# Patient Record
Sex: FEMALE | Race: WHITE
[De-identification: names, ages, dates, MRNs, and addresses within clinical notes are randomized per-mention and may not be internally consistent; named-entity substitution may affect disease eponyms.]

---

## 2017-03-16 ENCOUNTER — HOSPITAL (OUTPATIENT)
Dept: OTHER | Age: 62
End: 2017-03-16
Attending: INTERNAL MEDICINE

## 2017-03-16 LAB
ANALYZER ANC (IANC): NORMAL
ANION GAP SERPL CALC-SCNC: 15 MMOL/L (ref 10–20)
APTT PPP: 25 SECONDS (ref 22–30)
APTT PPP: NORMAL S
BASOPHILS # BLD: 0 THOUSAND/MCL (ref 0–0.3)
BASOPHILS NFR BLD: 0 %
BUN SERPL-MCNC: 15 MG/DL (ref 6–20)
BUN/CREAT SERPL: 24 (ref 7–25)
CALCIUM SERPL-MCNC: 8.7 MG/DL (ref 8.4–10.2)
CHLORIDE: 103 MMOL/L (ref 98–107)
CO2 SERPL-SCNC: 25 MMOL/L (ref 21–32)
CREAT SERPL-MCNC: 0.63 MG/DL (ref 0.51–0.95)
D DIMER PPP FEU-MCNC: 0.53 MG/L FEU
DIFFERENTIAL METHOD BLD: NORMAL
EOSINOPHIL # BLD: 0.5 THOUSAND/MCL (ref 0.1–0.5)
EOSINOPHIL NFR BLD: 7 %
ERYTHROCYTE [DISTWIDTH] IN BLOOD: 14 % (ref 11–15)
GLUCOSE SERPL-MCNC: 193 MG/DL (ref 65–99)
HEMATOCRIT: 37.1 % (ref 36–46.5)
HGB BLD-MCNC: 12 GM/DL (ref 12–15.5)
INR PPP: 1
LYMPHOCYTES # BLD: 3.8 THOUSAND/MCL (ref 1–4)
LYMPHOCYTES NFR BLD: 53 %
MCH RBC QN AUTO: 27.8 PG (ref 26–34)
MCHC RBC AUTO-ENTMCNC: 32.3 GM/DL (ref 32–36.5)
MCV RBC AUTO: 85.9 FL (ref 78–100)
MONOCYTES # BLD: 0.3 THOUSAND/MCL (ref 0.3–0.9)
MONOCYTES NFR BLD: 4 %
NEUTROPHILS # BLD: 2.5 THOUSAND/MCL (ref 1.8–7.7)
NEUTROPHILS NFR BLD: 36 %
NEUTS SEG NFR BLD: NORMAL %
PERCENT NRBC: NORMAL
PLATELET # BLD: 388 THOUSAND/MCL (ref 140–450)
POTASSIUM SERPL-SCNC: 3.3 MMOL/L (ref 3.4–5.1)
PROTHROMBIN TIME: 10.3 SECONDS (ref 9.7–11.8)
PROTHROMBIN TIME: NORMAL
RBC # BLD: 4.32 MILLION/MCL (ref 4–5.2)
SODIUM SERPL-SCNC: 140 MMOL/L (ref 135–145)
TROPONIN I SERPL HS-MCNC: <0.02 NG/ML
WBC # BLD: 7.1 THOUSAND/MCL (ref 4.2–11)

## 2017-03-17 LAB
2009 H1N1 SUBTYPE (RF1N1): NOT DETECTED
ADENOVIRUS (RADENO): NOT DETECTED
ALBUMIN SERPL-MCNC: 3.3 GM/DL (ref 3.6–5.1)
ALBUMIN/GLOB SERPL: 0.9 {RATIO} (ref 1–2.4)
ALP SERPL-CCNC: 115 UNIT/L (ref 45–117)
ALT SERPL-CCNC: 14 UNIT/L
ANALYZER ANC (IANC): ABNORMAL
ANION GAP SERPL CALC-SCNC: 15 MMOL/L (ref 10–20)
AST SERPL-CCNC: 12 UNIT/L
BILIRUB SERPL-MCNC: 0.2 MG/DL (ref 0.2–1)
BOCAVIRUS (RBOCA): NOT DETECTED
BUN SERPL-MCNC: 12 MG/DL (ref 6–20)
BUN/CREAT SERPL: 23 (ref 7–25)
C. PNEUMONIAE (RCHLP): NOT DETECTED
CALCIUM SERPL-MCNC: 8.9 MG/DL (ref 8.4–10.2)
CHLORIDE: 107 MMOL/L (ref 98–107)
CO2 SERPL-SCNC: 24 MMOL/L (ref 21–32)
CORONAVIRUS 229E (RC229E): NOT DETECTED
CORONAVIRUS HKU1 (RCHKU1): NOT DETECTED
CORONAVIRUS NL63 (RCNL63): NOT DETECTED
CORONAVIRUS OC43 (RCO43): NOT DETECTED
CREAT SERPL-MCNC: 0.52 MG/DL (ref 0.51–0.95)
ERYTHROCYTE [DISTWIDTH] IN BLOOD: 14.3 % (ref 11–15)
GLOBULIN SER-MCNC: 3.5 GM/DL (ref 2–4)
GLUCOSE BLDC GLUCOMTR-MCNC: 263 MG/DL (ref 65–99)
GLUCOSE BLDC GLUCOMTR-MCNC: 330 MG/DL (ref 65–99)
GLUCOSE BLDC GLUCOMTR-MCNC: 331 MG/DL (ref 65–99)
GLUCOSE BLDC GLUCOMTR-MCNC: 359 MG/DL (ref 65–99)
GLUCOSE SERPL-MCNC: 323 MG/DL (ref 65–99)
HEMATOCRIT: 34.7 % (ref 36–46.5)
HGB BLD-MCNC: 11.3 GM/DL (ref 12–15.5)
INFLUENZA A SUBTYPE H1 (RFLH1): NOT DETECTED
INFLUENZA A SUBTYPE H3 (RFLH3): NOT DETECTED
INFLUENZA A UNSUBTYPABLE (RIAU): NOT DETECTED
INFLUENZA B VIRUS (XFLUB): NOT DETECTED
M. PNEUMONIAE (RMYPP): NOT DETECTED
MCH RBC QN AUTO: 27.8 PG (ref 26–34)
MCHC RBC AUTO-ENTMCNC: 32.6 GM/DL (ref 32–36.5)
MCV RBC AUTO: 85.3 FL (ref 78–100)
METAPNEUMOVIRUS (RMETA): NOT DETECTED
PARAINFLUENZA, TYPE 1 (RPAR1): NOT DETECTED
PARAINFLUENZA, TYPE 2 (RPAR2): NOT DETECTED
PARAINFLUENZA, TYPE 3 (RPAR3): NOT DETECTED
PARAINFLUENZA, TYPE 4 (RPAR4): NOT DETECTED
PLATELET # BLD: 341 THOUSAND/MCL (ref 140–450)
POTASSIUM SERPL-SCNC: 4 MMOL/L (ref 3.4–5.1)
PROCALCITONIN SERPL IA-MCNC: <0.05 NG/ML
PROT SERPL-MCNC: 6.8 GM/DL (ref 6.4–8.2)
RBC # BLD: 4.07 MILLION/MCL (ref 4–5.2)
RHINOVIRUS/ENTEROVIRUS (RRHINO): NOT DETECTED
RSV, SUBTYPE A (RRSVA): NOT DETECTED
RSV, SUBTYPE B (RRSVB): NOT DETECTED
SODIUM SERPL-SCNC: 142 MMOL/L (ref 135–145)
SPECIMEN SOURCE: NORMAL
WBC # BLD: 5.3 THOUSAND/MCL (ref 4.2–11)

## 2017-03-18 LAB
ANALYZER ANC (IANC): ABNORMAL
ANION GAP SERPL CALC-SCNC: 16 MMOL/L (ref 10–20)
BASOPHILS # BLD: 0 THOUSAND/MCL (ref 0–0.3)
BASOPHILS NFR BLD: 0 %
BUN SERPL-MCNC: 24 MG/DL (ref 6–20)
BUN/CREAT SERPL: 39 (ref 7–25)
CALCIUM SERPL-MCNC: 8.9 MG/DL (ref 8.4–10.2)
CHLORIDE: 107 MMOL/L (ref 98–107)
CO2 SERPL-SCNC: 24 MMOL/L (ref 21–32)
CREAT SERPL-MCNC: 0.62 MG/DL (ref 0.51–0.95)
DIFFERENTIAL METHOD BLD: ABNORMAL
EOSINOPHIL # BLD: 0 THOUSAND/MCL (ref 0.1–0.5)
EOSINOPHIL NFR BLD: 0 %
ERYTHROCYTE [DISTWIDTH] IN BLOOD: 14.4 % (ref 11–15)
GLUCOSE BLDC GLUCOMTR-MCNC: 300 MG/DL (ref 65–99)
GLUCOSE BLDC GLUCOMTR-MCNC: 311 MG/DL (ref 65–99)
GLUCOSE BLDC GLUCOMTR-MCNC: 341 MG/DL (ref 65–99)
GLUCOSE BLDC GLUCOMTR-MCNC: 366 MG/DL (ref 65–99)
GLUCOSE BLDC GLUCOMTR-MCNC: 414 MG/DL (ref 65–99)
GLUCOSE SERPL-MCNC: 371 MG/DL (ref 65–99)
HEMATOCRIT: 35.3 % (ref 36–46.5)
HGB BLD-MCNC: 11.3 GM/DL (ref 12–15.5)
LYMPHOCYTES # BLD: 0.9 THOUSAND/MCL (ref 1–4)
LYMPHOCYTES NFR BLD: 6 %
MCH RBC QN AUTO: 27.5 PG (ref 26–34)
MCHC RBC AUTO-ENTMCNC: 32 GM/DL (ref 32–36.5)
MCV RBC AUTO: 85.9 FL (ref 78–100)
MONOCYTES # BLD: 0.3 THOUSAND/MCL (ref 0.3–0.9)
MONOCYTES NFR BLD: 2 %
NEUTROPHILS # BLD: 14.1 THOUSAND/MCL (ref 1.8–7.7)
NEUTROPHILS NFR BLD: 92 %
NEUTS SEG NFR BLD: ABNORMAL %
PERCENT NRBC: ABNORMAL
PLATELET # BLD: 383 THOUSAND/MCL (ref 140–450)
POTASSIUM SERPL-SCNC: 4.2 MMOL/L (ref 3.4–5.1)
RBC # BLD: 4.11 MILLION/MCL (ref 4–5.2)
SODIUM SERPL-SCNC: 143 MMOL/L (ref 135–145)
WBC # BLD: 15.2 THOUSAND/MCL (ref 4.2–11)

## 2017-03-19 LAB
ANALYZER ANC (IANC): ABNORMAL
ANION GAP SERPL CALC-SCNC: 12 MMOL/L (ref 10–20)
BASOPHILS # BLD: 0 THOUSAND/MCL (ref 0–0.3)
BASOPHILS NFR BLD: 0 %
BUN SERPL-MCNC: 26 MG/DL (ref 6–20)
BUN/CREAT SERPL: 46 (ref 7–25)
CALCIUM SERPL-MCNC: 8.6 MG/DL (ref 8.4–10.2)
CHLORIDE: 107 MMOL/L (ref 98–107)
CO2 SERPL-SCNC: 26 MMOL/L (ref 21–32)
CREAT SERPL-MCNC: 0.56 MG/DL (ref 0.51–0.95)
DIFFERENTIAL METHOD BLD: ABNORMAL
EOSINOPHIL # BLD: 0 THOUSAND/MCL (ref 0.1–0.5)
EOSINOPHIL NFR BLD: 0 %
ERYTHROCYTE [DISTWIDTH] IN BLOOD: 14.4 % (ref 11–15)
GLUCOSE BLDC GLUCOMTR-MCNC: 293 MG/DL (ref 65–99)
GLUCOSE BLDC GLUCOMTR-MCNC: 294 MG/DL (ref 65–99)
GLUCOSE BLDC GLUCOMTR-MCNC: 297 MG/DL (ref 65–99)
GLUCOSE BLDC GLUCOMTR-MCNC: 305 MG/DL (ref 65–99)
GLUCOSE BLDC GLUCOMTR-MCNC: 377 MG/DL (ref 65–99)
GLUCOSE SERPL-MCNC: 271 MG/DL (ref 65–99)
HEMATOCRIT: 33 % (ref 36–46.5)
HGB BLD-MCNC: 10.9 GM/DL (ref 12–15.5)
LYMPHOCYTES # BLD: 0.8 THOUSAND/MCL (ref 1–4)
LYMPHOCYTES NFR BLD: 6 %
MCH RBC QN AUTO: 28.2 PG (ref 26–34)
MCHC RBC AUTO-ENTMCNC: 33 GM/DL (ref 32–36.5)
MCV RBC AUTO: 85.3 FL (ref 78–100)
MONOCYTES # BLD: 0.4 THOUSAND/MCL (ref 0.3–0.9)
MONOCYTES NFR BLD: 4 %
NEUTROPHILS # BLD: 11.2 THOUSAND/MCL (ref 1.8–7.7)
NEUTROPHILS NFR BLD: 90 %
NEUTS SEG NFR BLD: ABNORMAL %
PERCENT NRBC: ABNORMAL
PLATELET # BLD: 351 THOUSAND/MCL (ref 140–450)
POTASSIUM SERPL-SCNC: 4.2 MMOL/L (ref 3.4–5.1)
RBC # BLD: 3.87 MILLION/MCL (ref 4–5.2)
SODIUM SERPL-SCNC: 141 MMOL/L (ref 135–145)
WBC # BLD: 12.4 THOUSAND/MCL (ref 4.2–11)

## 2017-03-20 LAB
GLUCOSE BLDC GLUCOMTR-MCNC: 170 MG/DL (ref 65–99)
GLUCOSE BLDC GLUCOMTR-MCNC: 234 MG/DL (ref 65–99)
GLUCOSE BLDC GLUCOMTR-MCNC: 264 MG/DL (ref 65–99)
GLUCOSE BLDC GLUCOMTR-MCNC: 271 MG/DL (ref 65–99)

## 2017-06-08 ENCOUNTER — OFFICE VISIT (OUTPATIENT)
Dept: PULMONOLOGY | Facility: CLINIC | Age: 62
End: 2017-06-08

## 2017-06-08 VITALS
DIASTOLIC BLOOD PRESSURE: 67 MMHG | SYSTOLIC BLOOD PRESSURE: 107 MMHG | RESPIRATION RATE: 18 BRPM | BODY MASS INDEX: 34.15 KG/M2 | HEIGHT: 64 IN | HEART RATE: 63 BPM | OXYGEN SATURATION: 98 % | WEIGHT: 200 LBS

## 2017-06-08 DIAGNOSIS — G47.33 OSA (OBSTRUCTIVE SLEEP APNEA): Primary | ICD-10-CM

## 2017-06-08 PROCEDURE — 99212 OFFICE O/P EST SF 10 MIN: CPT | Performed by: INTERNAL MEDICINE

## 2017-06-08 PROCEDURE — 99244 OFF/OP CNSLTJ NEW/EST MOD 40: CPT | Performed by: INTERNAL MEDICINE

## 2017-06-08 NOTE — H&P
Referring Physician  Amna Enriquez MD    Chief Complaint  Asthma    History of Present Illness  Patient presents to pulmonary clinic for initial visit. Patient admits to underlying history of asthma first diagnosed approximately 4 years ago.   She s coronary artery disease, hypertension     Social History  Tobacco: Denies  Alcohol: Significant intake  Illicit Drugs: Denies    Medications    Current Outpatient Prescriptions on File Prior to Visit:  Metoprolol Succinate ER (TOPROL XL) 50 MG Oral Tablet Clindamycin HCl (CLEOCIN) 300 MG Oral Cap Take  by mouth 3 (three) times daily.  Disp:  Rfl:    Warfarin Sodium (COUMADIN) 5 MG Oral Tab Take 1 tablet (5 mg) Mon/Thurs and 1-1/2 tablets (7.5 mg) rest of week OR as directed Disp: 125 tablet Rfl: 3     No c surgery but continues to have multiple episodes yearly of sinusitis. I advised her to follow-up with ENT regarding this matter.  -She with underlying history of obstructive sleep apnea but she is noncompliant with CPAP therapy.   She currently does not hav

## 2017-06-08 NOTE — PATIENT INSTRUCTIONS
Contact office 1 week after sleep study if we do not call you regarding results. Notify us before you see ENT so he may send our records.

## 2017-06-15 ENCOUNTER — TELEPHONE (OUTPATIENT)
Dept: PULMONOLOGY | Facility: CLINIC | Age: 62
End: 2017-06-15

## 2017-06-15 DIAGNOSIS — G47.33 OSA (OBSTRUCTIVE SLEEP APNEA): Primary | ICD-10-CM

## 2017-06-15 NOTE — TELEPHONE ENCOUNTER
Pt states she had sleep study done 5 years ago and only used CPAP machine for a short period of time. Spoke to Bushra from [facility] she states she is seeing some Medicaid plans paying for new CPAP machines with the old sleep studies.   Dr. Caroline Abel, do you want

## 2017-06-15 NOTE — TELEPHONE ENCOUNTER
Patel would like to know why  is ordering another diagnostic study in member that has already been diagnosed with HORACIO. Please advise.

## 2017-06-16 NOTE — TELEPHONE ENCOUNTER
Order, face sheet, PSG, progress note faxed to [facility] [phone] and left for Mani Dean to . Fax confirmation received.

## 2017-06-16 NOTE — TELEPHONE ENCOUNTER
I have placed order for auto CPAP based on patient's previous sleep study from 2012.   Patient will need to request download data between 30 and 90 days of receiving device so we may review efficacy and compliance and potentially adjust settings accordingly

## 2017-06-20 ENCOUNTER — TELEPHONE (OUTPATIENT)
Dept: PULMONOLOGY | Facility: CLINIC | Age: 62
End: 2017-06-20

## 2017-06-28 ENCOUNTER — TELEPHONE (OUTPATIENT)
Dept: PULMONOLOGY | Facility: CLINIC | Age: 62
End: 2017-06-28

## 2017-06-28 DIAGNOSIS — G47.33 OSA (OBSTRUCTIVE SLEEP APNEA): Primary | ICD-10-CM

## 2017-06-28 NOTE — TELEPHONE ENCOUNTER
[id number] Marshall Jackson called to inform [facility] that Mrs. Alejandra/Juan denied orders for sleep study. Pts appt has been cancelled for 7/26/17 and pt needs to be informed. For additional questions contact Juan [phone].  Thank You

## 2017-06-28 NOTE — TELEPHONE ENCOUNTER
Spoke to Eduard/[facility] who stts that pt was set up with an auto cpap machine on June 23. Arya Sanchez they have everything that was needed.

## 2017-06-29 NOTE — TELEPHONE ENCOUNTER
Eduard/[facility] notified that pt needs a mask fitting. Yi Patton they will give the patient a call to set up appt.

## 2017-07-27 PROBLEM — M23.91 INTERNAL DERANGEMENT OF KNEE, RIGHT: Status: ACTIVE | Noted: 2017-07-27

## 2017-07-31 PROBLEM — L85.9 HYPERKERATOSIS: Status: ACTIVE | Noted: 2017-07-31

## 2017-09-12 ENCOUNTER — OFFICE VISIT (OUTPATIENT)
Dept: PULMONOLOGY | Facility: CLINIC | Age: 62
End: 2017-09-12

## 2017-09-12 VITALS
HEIGHT: 64 IN | HEART RATE: 66 BPM | SYSTOLIC BLOOD PRESSURE: 145 MMHG | RESPIRATION RATE: 18 BRPM | BODY MASS INDEX: 33.97 KG/M2 | OXYGEN SATURATION: 96 % | DIASTOLIC BLOOD PRESSURE: 83 MMHG | WEIGHT: 199 LBS

## 2017-09-12 DIAGNOSIS — G47.33 OSA (OBSTRUCTIVE SLEEP APNEA): Primary | ICD-10-CM

## 2017-09-12 PROCEDURE — 99212 OFFICE O/P EST SF 10 MIN: CPT | Performed by: INTERNAL MEDICINE

## 2017-09-12 PROCEDURE — 99213 OFFICE O/P EST LOW 20 MIN: CPT | Performed by: INTERNAL MEDICINE

## 2017-09-12 RX ORDER — LOSARTAN POTASSIUM 100 MG/1
TABLET ORAL
COMMUNITY
End: 2020-05-19

## 2017-09-12 RX ORDER — AMOXICILLIN 500 MG/1
CAPSULE ORAL
Refills: 0 | COMMUNITY
Start: 2017-09-11 | End: 2019-06-06 | Stop reason: ALTCHOICE

## 2017-09-12 RX ORDER — IPRATROPIUM BROMIDE AND ALBUTEROL SULFATE 2.5; .5 MG/3ML; MG/3ML
3 SOLUTION RESPIRATORY (INHALATION)
COMMUNITY

## 2017-09-12 RX ORDER — OMEPRAZOLE 40 MG/1
CAPSULE, DELAYED RELEASE ORAL
Refills: 7 | COMMUNITY
Start: 2017-08-30 | End: 2019-10-03 | Stop reason: CLARIF

## 2017-09-12 RX ORDER — BUDESONIDE AND FORMOTEROL FUMARATE DIHYDRATE 160; 4.5 UG/1; UG/1
AEROSOL RESPIRATORY (INHALATION)
Refills: 2 | COMMUNITY
Start: 2017-07-10 | End: 2021-09-09

## 2017-09-12 RX ORDER — ALPRAZOLAM 0.25 MG/1
TABLET ORAL
Refills: 0 | COMMUNITY
Start: 2017-07-12 | End: 2019-06-06 | Stop reason: ALTCHOICE

## 2017-09-12 RX ORDER — TRIAMCINOLONE ACETONIDE 0.25 MG/G
CREAM TOPICAL
COMMUNITY

## 2017-09-12 RX ORDER — CHLORHEXIDINE GLUCONATE 0.12 MG/ML
RINSE ORAL
Refills: 0 | COMMUNITY
Start: 2017-08-30 | End: 2019-06-06 | Stop reason: ALTCHOICE

## 2017-09-12 RX ORDER — CETIRIZINE HYDROCHLORIDE 10 MG/1
TABLET ORAL
Refills: 1 | COMMUNITY
Start: 2017-07-18 | End: 2020-05-19

## 2017-09-12 RX ORDER — AZELASTINE 1 MG/ML
SPRAY, METERED NASAL
COMMUNITY
End: 2019-07-22 | Stop reason: ALTCHOICE

## 2017-09-12 RX ORDER — ATORVASTATIN CALCIUM 10 MG/1
10 TABLET, FILM COATED ORAL DAILY
Refills: 0 | COMMUNITY
Start: 2017-09-12 | End: 2020-05-19

## 2017-09-12 RX ORDER — SERTRALINE HYDROCHLORIDE 100 MG/1
TABLET, FILM COATED ORAL
Refills: 2 | COMMUNITY
Start: 2017-09-12 | End: 2020-12-08 | Stop reason: ALTCHOICE

## 2017-09-12 NOTE — PROGRESS NOTES
Referring Physician  Nola Saenz MD    History of Present Illness  Patient is a 44-year-old female who presents the pulmonary clinic for follow-up visit.   Over the course last several months she states that she had to go to emergency department on Albuterol Sulfate HFA (PROAIR HFA) 108 (90 BASE) MCG/ACT Inhalation Aero Soln Inhale 2 puffs into the lungs every 6 (six) hours as needed.  Disp:  Rfl:    Elastic Bandages & Supports (T.E.D. BELOW KNEE/LARGE) Does not apply Misc please wear daily Disp: 2 that her asthma symptoms are somewhat improved over the course last several months. She admits to using Symbicort, Singulair as scheduled.   She has not been using her nebulizer treatments often which I advised her to use on as-needed basis at home and use

## 2018-07-10 ENCOUNTER — CHARTING TRANS (OUTPATIENT)
Dept: OTHER | Age: 63
End: 2018-07-10

## 2018-08-14 ENCOUNTER — OFFICE VISIT (OUTPATIENT)
Dept: PULMONOLOGY | Facility: CLINIC | Age: 63
End: 2018-08-14
Payer: MEDICAID

## 2018-08-14 VITALS
HEART RATE: 75 BPM | DIASTOLIC BLOOD PRESSURE: 82 MMHG | BODY MASS INDEX: 35.79 KG/M2 | HEIGHT: 63 IN | OXYGEN SATURATION: 98 % | SYSTOLIC BLOOD PRESSURE: 136 MMHG | WEIGHT: 202 LBS | RESPIRATION RATE: 18 BRPM

## 2018-08-14 DIAGNOSIS — G47.33 OSA (OBSTRUCTIVE SLEEP APNEA): Primary | ICD-10-CM

## 2018-08-14 PROCEDURE — 99213 OFFICE O/P EST LOW 20 MIN: CPT | Performed by: INTERNAL MEDICINE

## 2018-08-14 PROCEDURE — 99212 OFFICE O/P EST SF 10 MIN: CPT | Performed by: INTERNAL MEDICINE

## 2018-08-14 NOTE — PROGRESS NOTES
Referring Physician  Nola Saenz MD    History of Present Illness  Patient is a 37-year-old female who presents the pulmonary clinic for follow-up visit. Patient states her asthma symptoms have been somewhat well controlled.   However she does adm Oral Tab TAKE 1 TABLET BY MOUTH DAILY Disp: 90 tablet Rfl: 2   MetFORMIN HCl (GLUCOPHAGE) 500 MG Oral Tab TAKE 1 TABLET BY MOUTH 2 TIMES DAILY WITH MEALS  (Patient taking differently: Pt stts she takes 750 mg daily) Disp: 60 tablet Rfl: 6   predniSONE (DEL RASH  Toradol [Ketorolac *        Comment:Shortness of breath (also received fentanyl and             hydromorphone at the same time)    Physical Exam  Constitutional: no acute distress, alert, oriented  HEENT: PERRL, EOMI, nares patents, no nasal p

## 2018-08-15 ENCOUNTER — TELEPHONE (OUTPATIENT)
Dept: PULMONOLOGY | Facility: CLINIC | Age: 63
End: 2018-08-15

## 2018-08-15 DIAGNOSIS — G47.33 OSA (OBSTRUCTIVE SLEEP APNEA): Primary | ICD-10-CM

## 2018-08-15 NOTE — TELEPHONE ENCOUNTER
Pt had office visit yesterday and calling back to provide the name of her Inhaler:Fluticasone Procionaee Salneterol (inhaler power) any questions pls call at:[phone],thanks.

## 2018-08-17 NOTE — TELEPHONE ENCOUNTER
Pt also wants to report to [facility] she was told from [address] [place] [address] her insurance does not cover oral appliance and she could not afford it was over $3000. Pt informed a msg will be sent to [facility] to inform, pt voiced understanding.     Pt advised to take AirDuo 1 p

## 2018-08-21 NOTE — TELEPHONE ENCOUNTER
If it appears that the dental device is not an option due to cost purposes, other options include ENT referral to evaluate for possible surgery if indicated. Let me know if she is agreeable with this.

## 2018-08-22 NOTE — TELEPHONE ENCOUNTER
Pt given phone number to ENT p# [phone] to schedule an appointment w/ Dr. Jana Gibbs or Dr. Jael Coto, pt voiced understanding.

## 2018-08-22 NOTE — TELEPHONE ENCOUNTER
Are you able to put an ENT referral for Dr Kalee Pugh and/or Dr. Brisa Oates and I will sign off on referral.

## 2018-10-31 VITALS
TEMPERATURE: 97.9 F | DIASTOLIC BLOOD PRESSURE: 95 MMHG | OXYGEN SATURATION: 100 % | HEART RATE: 85 BPM | WEIGHT: 195 LBS | HEIGHT: 62 IN | BODY MASS INDEX: 35.88 KG/M2 | SYSTOLIC BLOOD PRESSURE: 175 MMHG

## 2019-01-01 ENCOUNTER — EXTERNAL RECORD (OUTPATIENT)
Dept: HEALTH INFORMATION MANAGEMENT | Facility: OTHER | Age: 64
End: 2019-01-01

## 2019-05-30 ENCOUNTER — TELEPHONE (OUTPATIENT)
Dept: SCHEDULING | Age: 64
End: 2019-05-30

## 2019-05-31 ENCOUNTER — APPOINTMENT (OUTPATIENT)
Dept: INTERNAL MEDICINE | Age: 64
End: 2019-05-31

## 2019-06-06 PROBLEM — L85.9 HYPERKERATOSIS: Status: RESOLVED | Noted: 2017-07-31 | Resolved: 2019-06-06

## 2019-06-06 PROBLEM — Z86.718 HISTORY OF DVT (DEEP VEIN THROMBOSIS): Status: ACTIVE | Noted: 2019-06-06

## 2019-06-06 PROBLEM — M23.91 INTERNAL DERANGEMENT OF KNEE, RIGHT: Status: RESOLVED | Noted: 2017-07-27 | Resolved: 2019-06-06

## 2019-06-06 PROBLEM — Z71.9 ENCOUNTER FOR CONSULTATION: Status: ACTIVE | Noted: 2019-06-06

## 2019-10-03 PROBLEM — Z71.9 ENCOUNTER FOR CONSULTATION: Status: RESOLVED | Noted: 2019-06-06 | Resolved: 2019-10-03

## 2019-10-24 ENCOUNTER — ANESTHESIA (OUTPATIENT)
Dept: ENDOSCOPY | Facility: HOSPITAL | Age: 64
End: 2019-10-24
Payer: MEDICAID

## 2019-10-24 ENCOUNTER — ANESTHESIA EVENT (OUTPATIENT)
Dept: ENDOSCOPY | Facility: HOSPITAL | Age: 64
End: 2019-10-24
Payer: MEDICAID

## 2019-10-24 ENCOUNTER — HOSPITAL ENCOUNTER (OUTPATIENT)
Facility: HOSPITAL | Age: 64
Setting detail: HOSPITAL OUTPATIENT SURGERY
Discharge: HOME | End: 2019-10-24
Attending: INTERNAL MEDICINE | Admitting: INTERNAL MEDICINE
Payer: MEDICAID

## 2019-10-24 DIAGNOSIS — K63.5 POLYP OF DESCENDING COLON: ICD-10-CM

## 2019-10-24 DIAGNOSIS — K63.5 POLYP OF TRANSVERSE COLON: ICD-10-CM

## 2019-10-24 DIAGNOSIS — K64.8 INTERNAL HEMORRHOIDS WITHOUT COMPLICATION: ICD-10-CM

## 2019-10-24 DIAGNOSIS — K29.70 GASTRITIS: ICD-10-CM

## 2019-10-24 DIAGNOSIS — K21.9 GASTROESOPHAGEAL REFLUX DISEASE, ESOPHAGITIS PRESENCE NOT SPECIFIED: ICD-10-CM

## 2019-10-24 DIAGNOSIS — K44.9 HIATAL HERNIA: ICD-10-CM

## 2019-10-24 DIAGNOSIS — K20.90 ESOPHAGITIS: Primary | ICD-10-CM

## 2019-10-24 DIAGNOSIS — Z86.010 HISTORY OF COLON POLYPS: ICD-10-CM

## 2019-10-24 DIAGNOSIS — K57.30 DIVERTICULOSIS LARGE INTESTINE W/O PERFORATION OR ABSCESS W/O BLEEDING: ICD-10-CM

## 2019-10-24 PROCEDURE — 88312 SPECIAL STAINS GROUP 1: CPT | Performed by: INTERNAL MEDICINE

## 2019-10-24 PROCEDURE — 0DB78ZX EXCISION OF STOMACH, PYLORUS, VIA NATURAL OR ARTIFICIAL OPENING ENDOSCOPIC, DIAGNOSTIC: ICD-10-PCS | Performed by: INTERNAL MEDICINE

## 2019-10-24 PROCEDURE — 0DBL8ZX EXCISION OF TRANSVERSE COLON, VIA NATURAL OR ARTIFICIAL OPENING ENDOSCOPIC, DIAGNOSTIC: ICD-10-PCS | Performed by: INTERNAL MEDICINE

## 2019-10-24 PROCEDURE — 0DBM8ZX EXCISION OF DESCENDING COLON, VIA NATURAL OR ARTIFICIAL OPENING ENDOSCOPIC, DIAGNOSTIC: ICD-10-PCS | Performed by: INTERNAL MEDICINE

## 2019-10-24 PROCEDURE — 88305 TISSUE EXAM BY PATHOLOGIST: CPT | Performed by: INTERNAL MEDICINE

## 2019-10-24 PROCEDURE — 0DB68ZX EXCISION OF STOMACH, VIA NATURAL OR ARTIFICIAL OPENING ENDOSCOPIC, DIAGNOSTIC: ICD-10-PCS | Performed by: INTERNAL MEDICINE

## 2019-10-24 PROCEDURE — 82962 GLUCOSE BLOOD TEST: CPT

## 2019-10-24 RX ORDER — SODIUM CHLORIDE, SODIUM LACTATE, POTASSIUM CHLORIDE, CALCIUM CHLORIDE 600; 310; 30; 20 MG/100ML; MG/100ML; MG/100ML; MG/100ML
INJECTION, SOLUTION INTRAVENOUS CONTINUOUS
Status: DISCONTINUED | OUTPATIENT
Start: 2019-10-24 | End: 2019-10-24

## 2019-10-24 RX ORDER — DEXTROSE MONOHYDRATE 25 G/50ML
50 INJECTION, SOLUTION INTRAVENOUS
Status: DISCONTINUED | OUTPATIENT
Start: 2019-10-24 | End: 2019-10-24

## 2019-10-24 RX ORDER — LIDOCAINE HYDROCHLORIDE 10 MG/ML
INJECTION, SOLUTION EPIDURAL; INFILTRATION; INTRACAUDAL; PERINEURAL AS NEEDED
Status: DISCONTINUED | OUTPATIENT
Start: 2019-10-24 | End: 2019-10-24 | Stop reason: SURG

## 2019-10-24 RX ORDER — NALOXONE HYDROCHLORIDE 0.4 MG/ML
80 INJECTION, SOLUTION INTRAMUSCULAR; INTRAVENOUS; SUBCUTANEOUS AS NEEDED
Status: DISCONTINUED | OUTPATIENT
Start: 2019-10-24 | End: 2019-10-24

## 2019-10-24 RX ADMIN — SODIUM CHLORIDE, SODIUM LACTATE, POTASSIUM CHLORIDE, CALCIUM CHLORIDE: 600; 310; 30; 20 INJECTION, SOLUTION INTRAVENOUS at 13:10:00

## 2019-10-24 RX ADMIN — LIDOCAINE HYDROCHLORIDE 25 MG: 10 INJECTION, SOLUTION EPIDURAL; INFILTRATION; INTRACAUDAL; PERINEURAL at 12:39:00

## 2019-10-24 RX ADMIN — SODIUM CHLORIDE, SODIUM LACTATE, POTASSIUM CHLORIDE, CALCIUM CHLORIDE: 600; 310; 30; 20 INJECTION, SOLUTION INTRAVENOUS at 12:40:00

## 2019-10-24 NOTE — OPERATIVE REPORT
PATIENT NAME: Amie Allen  MRN: [id number]  DATE OF OPERATION: 10/24/2019  PREOPERATIVE DIAGNOSIS:   1. History of esophagitis LA grade C.  2. Gastric intestinal metaplasia  3. Personal history of colon polyps  POSTOPERATIVE DIAGNOSES:  1.  Hiatal hernia lesser curvature. The pylorus was patent. The gastric folds were not thickened. We able to distend the stomach adequately with air insufflation. The cardia and fundus were inspected and retroflexion.   The gastric content were then suctioned at the [facility]  [address]

## 2019-10-24 NOTE — ANESTHESIA POSTPROCEDURE EVALUATION
Patient: Jesus Beebe    Procedure Summary     Date:  10/24/19 Room / Location:  [facility] ENDOSCOPY 01 / [facility] ENDOSCOPY    Anesthesia Start:  2009 Anesthesia Stop:      Procedures:       COLONOSCOPY (N/A )      ESOPHAGOGASTRODUODENOSCOPY (EGD) (N/A ) Josr Love

## 2019-10-24 NOTE — ANESTHESIA PREPROCEDURE EVALUATION
Anesthesia PreOp Note    HPI:     Hiral Perez is a 60year old female who presents for preoperative consultation requested by: Karlo Braxton MD    Date of Surgery: 10/24/2019    Procedure(s):  COLONOSCOPY  ESOPHAGOGASTRODUODENOSCOPY (EGD)  Indicatio • HORACIO (obstructive sleep apnea)    • Pulmonary embolism ([facility] [address]    • Sleep apnea        Past Surgical History:   Procedure Laterality Date   • CHOLECYSTECTOMY     • COLONOSCOPY  2009   • FOOT/TOES SURGERY PROC UNLISTED      Vela neuroma   • INJECTION, ANES PEG 3350-KCl-Na Bicarb-NaCl (TRILYTE) 420 g Oral Recon Soln, Take as directed, split dose, Disp: 1 Bottle, Rfl: 0  ammonium lactate 12 % External Lotion, Apply topically. , Disp: , Rfl: , Taking  HYDROcodone-acetaminophen 5-325 MG Oral Tab, Take 1 tablet by Social History    Socioeconomic History      Marital status:       Spouse name: Not on file      Number of children: Not on file      Years of education: Not on file      Highest education level: Not on file    Occupational History      Not on file HGB 12.6 10/03/2019    HCT 40.9 10/03/2019    MCV 85.4 10/03/2019    MCH 26.3 (L) 10/03/2019    MCHC 30.8 (L) 10/03/2019    RDW 15.4 10/03/2019     10/03/2019     Lab Results   Component Value Date     (L) 10/03/2019    K 3.90 10/03/2019    C I have informed Maryann Israel and/or legal guardian or family member of the nature of the anesthetic plan, benefits, risks including possible dental damage if relevant, major complications, and any alternative forms of anesthetic management.    All of the

## 2019-10-25 VITALS
WEIGHT: 185 LBS | RESPIRATION RATE: 17 BRPM | OXYGEN SATURATION: 97 % | BODY MASS INDEX: 32.78 KG/M2 | SYSTOLIC BLOOD PRESSURE: 137 MMHG | HEIGHT: 63 IN | HEART RATE: 68 BPM | DIASTOLIC BLOOD PRESSURE: 92 MMHG

## 2019-10-28 NOTE — PROGRESS NOTES
10/28/2019  [facility] [address] [phone]    Dear Eva Childs,       Here are the biopsy/pathology findings from your recent EGD (upper endoscopy) and colonoscopy:     The biopsy/pathology findings from your upper endoscopy showed:

## 2020-01-09 ENCOUNTER — ANESTHESIA EVENT (OUTPATIENT)
Dept: ENDOSCOPY | Facility: HOSPITAL | Age: 65
End: 2020-01-09
Payer: MEDICAID

## 2020-01-09 ENCOUNTER — ANESTHESIA (OUTPATIENT)
Dept: ENDOSCOPY | Facility: HOSPITAL | Age: 65
End: 2020-01-09
Payer: MEDICAID

## 2020-01-09 ENCOUNTER — HOSPITAL ENCOUNTER (OUTPATIENT)
Facility: HOSPITAL | Age: 65
Setting detail: HOSPITAL OUTPATIENT SURGERY
Discharge: HOME | End: 2020-01-09
Attending: INTERNAL MEDICINE | Admitting: INTERNAL MEDICINE
Payer: MEDICAID

## 2020-01-09 VITALS
RESPIRATION RATE: 14 BRPM | HEART RATE: 63 BPM | TEMPERATURE: 98 F | HEIGHT: 63 IN | DIASTOLIC BLOOD PRESSURE: 72 MMHG | WEIGHT: 175 LBS | SYSTOLIC BLOOD PRESSURE: 134 MMHG | OXYGEN SATURATION: 98 % | BODY MASS INDEX: 31.01 KG/M2

## 2020-01-09 DIAGNOSIS — K20.90 ESOPHAGITIS: ICD-10-CM

## 2020-01-09 PROCEDURE — 0DB48ZX EXCISION OF ESOPHAGOGASTRIC JUNCTION, VIA NATURAL OR ARTIFICIAL OPENING ENDOSCOPIC, DIAGNOSTIC: ICD-10-PCS | Performed by: INTERNAL MEDICINE

## 2020-01-09 PROCEDURE — 88312 SPECIAL STAINS GROUP 1: CPT | Performed by: INTERNAL MEDICINE

## 2020-01-09 PROCEDURE — 88305 TISSUE EXAM BY PATHOLOGIST: CPT | Performed by: INTERNAL MEDICINE

## 2020-01-09 RX ORDER — SODIUM CHLORIDE, SODIUM LACTATE, POTASSIUM CHLORIDE, CALCIUM CHLORIDE 600; 310; 30; 20 MG/100ML; MG/100ML; MG/100ML; MG/100ML
INJECTION, SOLUTION INTRAVENOUS CONTINUOUS
Status: DISCONTINUED | OUTPATIENT
Start: 2020-01-09 | End: 2020-01-09

## 2020-01-09 RX ORDER — SODIUM CHLORIDE, SODIUM LACTATE, POTASSIUM CHLORIDE, CALCIUM CHLORIDE 600; 310; 30; 20 MG/100ML; MG/100ML; MG/100ML; MG/100ML
INJECTION, SOLUTION INTRAVENOUS CONTINUOUS
Status: CANCELLED | OUTPATIENT
Start: 2020-01-09

## 2020-01-09 RX ORDER — DEXTROSE MONOHYDRATE 25 G/50ML
50 INJECTION, SOLUTION INTRAVENOUS
Status: CANCELLED | OUTPATIENT
Start: 2020-01-09

## 2020-01-09 RX ORDER — NALOXONE HYDROCHLORIDE 0.4 MG/ML
80 INJECTION, SOLUTION INTRAMUSCULAR; INTRAVENOUS; SUBCUTANEOUS AS NEEDED
Status: CANCELLED | OUTPATIENT
Start: 2020-01-09 | End: 2020-01-09

## 2020-01-09 NOTE — H&P
[facility] Department of  Gastroenterology  Update Health History :       Zane Hughes  female   Sahra Mcghee MD     [id number]  10/15/1955 Primary Care Physician  Dimple Gonzalez MD        HPI :  As of esophagitis, LA grade B found on uppe Never Used    Alcohol use: No    Drug use: No       ALLERGIES:     Azithromycin            RASH  Ceftriaxone             RASH  Fentanyl                UNKNOWN  Hydromorphone           UNKNOWN  Toradol [Ketorolac *        Comment:Shortness of breath (also r External Cream, DYLAN THIN LAYER EXT AA BID, Disp: , Rfl:   Fluticasone Propionate (FLONASE) 50 MCG/ACT Nasal Suspension, 1 spray both nostrils BID (Patient taking differently: as needed.  1 spray both nostrils BID), Disp: 1 Bottle, Rfl: 12  FREESTYLE LITE In

## 2020-01-09 NOTE — ANESTHESIA POSTPROCEDURE EVALUATION
Patient: Es Marr    Procedure Summary     Date:  01/09/20 Room / Location:  [facility] ENDOSCOPY 01 / [facility] ENDOSCOPY    Anesthesia Start:  8603 Anesthesia Stop:      Procedure:  ESOPHAGOGASTRODUODENOSCOPY (EGD) (N/A ) Diagnosis:       Esophagitis      (hia

## 2020-01-09 NOTE — ANESTHESIA PREPROCEDURE EVALUATION
Anesthesia PreOp Note    HPI:     Acacia Woodall is a 60year old female who presents for preoperative consultation requested by: Angie Brown MD    Date of Surgery: 1/9/2020    Procedure(s):  ESOPHAGOGASTRODUODENOSCOPY (EGD)  Indication: Esophagitis UNLISTED      Vela neuroma   • INJECTION, ANESTHETIC/STEROID, TRANSFORAMINAL EPIDURAL; LUMBAR/SACRAL, ADD'L LEVEL     • OTHER SURGICAL HISTORY  09/2018    neck surgery   • REVISION OF UTERINE ANOMALY      prolapse   • SINUS SURGERY    7/22/13       predn 2 puffs into the lungs 2 (two) times daily. , Disp: 3 Inhaler, Rfl: 0  clarithromycin 500 MG Oral Tab, Take 1 tablet (500 mg total) by mouth 2 (two) times daily for 10 days. , Disp: 20 tablet, Rfl: 0  DULoxetine HCl 30 MG Oral Cap DR Particles, Take 30 mg by Brother    • No Known Problems Brother    • No Known Problems Daughter    • No Known Problems Daughter    • Allergies Neg    • Asthma Neg      Social History    Socioeconomic History      Marital status:       Spouse name: Not on file      Number of Narrative      Not on file      Available pre-op labs reviewed. Lab Results   Component Value Date    PGLU 129 (H) 10/24/2019          Vital Signs: Body mass index is 31 kg/m². height is 1.6 m (5' 3\") and weight is 79.4 kg (175 lb).  Her blood press

## 2020-01-09 NOTE — OPERATIVE REPORT
OPERATIVE REPORT   PATIENT NAME: Hina Lord  MRN: [id number]  DATE OF OPERATION: 1/9/2020  PREOPERATIVE DIAGNOSIS:   1. Erosive esophagitis LA grade B noted on upper endoscopy done October 24, 2019. Gama Carter   She did have a history of esophagitis LA grade C on squamocolumnar junction for approximately 5 mm. The biopsy was taken from that area. 2 cm long sliding hiatal hernia Hill grade 2 plus  2. Normal stomach throughout with no evidence of ulcers, masses or other abnormalities.  Retroflexion revealed a normal

## 2020-01-10 NOTE — PROGRESS NOTES
1/10/2020  [facility] [address] [phone]    Dear Delfino Marrero,       Here are the biopsy/pathology findings from your recent EGD (Upper  Endoscopy):  1. Prior inflammation in the esophagus has healed.   Esophagus biopsies unremarkabl

## 2020-05-22 ENCOUNTER — TELEPHONE (OUTPATIENT)
Dept: PULMONOLOGY | Facility: CLINIC | Age: 65
End: 2020-05-22

## 2020-05-22 NOTE — TELEPHONE ENCOUNTER
Patient is returning a call. Per appointment notes, it appears Cris called the patient to reschedule the appt on 5/26. I attempted Triage no answer.  Please advise

## 2020-05-26 ENCOUNTER — OFFICE VISIT (OUTPATIENT)
Dept: PULMONOLOGY | Facility: CLINIC | Age: 65
End: 2020-05-26
Payer: MEDICAID

## 2020-05-26 VITALS
DIASTOLIC BLOOD PRESSURE: 91 MMHG | BODY MASS INDEX: 31.58 KG/M2 | HEIGHT: 64 IN | WEIGHT: 185 LBS | SYSTOLIC BLOOD PRESSURE: 162 MMHG | RESPIRATION RATE: 18 BRPM | HEART RATE: 69 BPM | OXYGEN SATURATION: 98 %

## 2020-05-26 DIAGNOSIS — G47.33 OSA (OBSTRUCTIVE SLEEP APNEA): Primary | ICD-10-CM

## 2020-05-26 PROCEDURE — 99213 OFFICE O/P EST LOW 20 MIN: CPT | Performed by: INTERNAL MEDICINE

## 2020-05-26 RX ORDER — MONTELUKAST SODIUM 10 MG/1
10 TABLET ORAL NIGHTLY
Qty: 30 TABLET | Refills: 5 | Status: SHIPPED | OUTPATIENT
Start: 2020-05-26 | End: 2020-12-09

## 2020-05-26 RX ORDER — CETIRIZINE HYDROCHLORIDE 10 MG/1
10 TABLET ORAL DAILY
Qty: 30 TABLET | Refills: 5 | Status: SHIPPED | OUTPATIENT
Start: 2020-05-26 | End: 2020-12-08 | Stop reason: ALTCHOICE

## 2020-05-26 RX ORDER — PREDNISONE 20 MG/1
20 TABLET ORAL DAILY
Qty: 30 TABLET | Refills: 1 | Status: SHIPPED | OUTPATIENT
Start: 2020-05-26 | End: 2020-12-08 | Stop reason: ALTCHOICE

## 2020-05-26 RX ORDER — BUDESONIDE AND FORMOTEROL FUMARATE DIHYDRATE 160; 4.5 UG/1; UG/1
2 AEROSOL RESPIRATORY (INHALATION) 2 TIMES DAILY
Qty: 1 INHALER | Refills: 5 | Status: SHIPPED | OUTPATIENT
Start: 2020-05-26 | End: 2021-03-03

## 2020-05-26 NOTE — PROGRESS NOTES
Referring Physician  Sue Hadley MD    History of Present Illness  Patient presents today for follow-up visit to pulmonary clinic. She states that she has not been using CPAP device for approximately 2 years.   Interested in using CPAP device once jesus acetonide 0.025 % External Cream, DYLAN THIN LAYER EXT AA BID, Disp: , Rfl:   Rivaroxaban (XARELTO) 20 MG Oral Tab, 20 mg daily with food.   , Disp: , Rfl:   Metoprolol Succinate ER (TOPROL XL) 50 MG Oral Tablet 24 Hr, TAKE 1/2 TABLET BY MOUTH TWICE DAILY, Carlita Coyne edema  Neurologic: no gross motor or sensory deficits  Skin: warm, dry  Lymphatic: no supraclavicular lymphadenopathy     Assessment  1. Extrinsic asthma   2. Chronic sinusitis  3. Upper airway cough syndrome  4. Obstructive sleep apnea  5.   Prior recu

## 2020-05-28 ENCOUNTER — TELEPHONE (OUTPATIENT)
Dept: PULMONOLOGY | Facility: CLINIC | Age: 65
End: 2020-05-28

## 2020-05-28 DIAGNOSIS — G47.33 OSA (OBSTRUCTIVE SLEEP APNEA): Primary | ICD-10-CM

## 2020-05-28 NOTE — TELEPHONE ENCOUNTER
Madeline Carter states Dx Sleep Study has been denied by insurance. Can order home sleep study or peer to peer for appeal.  Please call. Thank you.

## 2020-05-29 NOTE — TELEPHONE ENCOUNTER
[facility] is aware of Dr. Carley Hope orders below. She stts she already spoke to pt & home sleep study is scheduled for 6/11.

## 2020-06-11 ENCOUNTER — OFFICE VISIT (OUTPATIENT)
Dept: SLEEP CENTER | Age: 65
End: 2020-06-11
Attending: INTERNAL MEDICINE
Payer: MEDICAID

## 2020-06-11 DIAGNOSIS — G47.33 OSA (OBSTRUCTIVE SLEEP APNEA): ICD-10-CM

## 2020-06-11 PROCEDURE — 95806 SLEEP STUDY UNATT&RESP EFFT: CPT

## 2020-06-15 NOTE — PROCEDURES
[facility]  Accredited by the [place] of [employer] (AASM)    PATIENT'S NAME: Mayank Tavares   ATTENDING PHYSICIAN: Nicole Mcarthur. [facility]    REFERRING PHYSICIAN: Nicole Mcarthur.  Aixa Howard ACCOUNT #: [de-identified] LOCATION is 95 beats per minute. INTERPRETATION:  The data generated from this study is consistent with mild obstructive sleep apnea (ICD-10 code G47.33). RECOMMENDATIONS:    1. Consider CPAP titration. 2.   Weight loss. 3.   Avoid alcohol.   4.   Avoid se

## 2020-06-23 ENCOUNTER — TELEPHONE (OUTPATIENT)
Dept: PULMONOLOGY | Facility: CLINIC | Age: 65
End: 2020-06-23

## 2020-06-23 DIAGNOSIS — G47.33 OSA (OBSTRUCTIVE SLEEP APNEA): Primary | ICD-10-CM

## 2020-06-23 NOTE — TELEPHONE ENCOUNTER
Face sheet, DME order, home sleep test, and last office visit note faxed to Dianna Barreto. Fax confirmation received. Spoke with patient. Patient informed of Dr. Sammy Ramsey order and that order was faxed to [facility].  Inf

## 2020-06-23 NOTE — TELEPHONE ENCOUNTER
Spoke with patient. Patient informed of Dr. Blankenship Crosby message below. Patient agreeable for auto CPAP, states she is a mouth breather.

## 2020-06-23 NOTE — TELEPHONE ENCOUNTER
----- Message from Idalia Moss DO sent at 6/17/2020  1:14 PM CDT -----  You may let the patient know that I reviewed her sleep study results with evidence of mild obstructive sleep apnea. If she is agreeable I will order auto CPAP for her.   Can you

## 2020-12-11 RX ORDER — MONTELUKAST SODIUM 10 MG/1
10 TABLET ORAL NIGHTLY
Qty: 30 TABLET | Refills: 5 | Status: SHIPPED | OUTPATIENT
Start: 2020-12-11 | End: 2021-09-09

## 2020-12-18 ENCOUNTER — OFFICE VISIT (OUTPATIENT)
Dept: PULMONOLOGY | Facility: CLINIC | Age: 65
End: 2020-12-18
Payer: MEDICAID

## 2020-12-18 VITALS
BODY MASS INDEX: 33 KG/M2 | HEART RATE: 76 BPM | WEIGHT: 194 LBS | SYSTOLIC BLOOD PRESSURE: 155 MMHG | DIASTOLIC BLOOD PRESSURE: 89 MMHG | OXYGEN SATURATION: 97 %

## 2020-12-18 DIAGNOSIS — G47.33 OBSTRUCTIVE SLEEP APNEA: ICD-10-CM

## 2020-12-18 DIAGNOSIS — J45.30 MILD PERSISTENT ASTHMA WITHOUT COMPLICATION: Primary | ICD-10-CM

## 2020-12-18 PROCEDURE — 3077F SYST BP >= 140 MM HG: CPT | Performed by: PHYSICIAN ASSISTANT

## 2020-12-18 PROCEDURE — 99214 OFFICE O/P EST MOD 30 MIN: CPT | Performed by: PHYSICIAN ASSISTANT

## 2020-12-18 PROCEDURE — 3079F DIAST BP 80-89 MM HG: CPT | Performed by: PHYSICIAN ASSISTANT

## 2020-12-18 RX ORDER — PREDNISONE 20 MG/1
TABLET ORAL
Qty: 10 TABLET | Refills: 0 | Status: SHIPPED | OUTPATIENT
Start: 2020-12-18 | End: 2021-09-09 | Stop reason: ALTCHOICE

## 2020-12-18 NOTE — PROGRESS NOTES
Pulmonary Progress Note    History of Present Illness:  Randi Dumont is a 63year old female presenting to pulmonary clinic today for follow-up. She is a known patient to Dr. Dk Caballero.   The patient was recently diagnosed with obstructive sleep apnea and s Head NC/AT. PEERL. Crowded oropharynx. Mallampati class 4. Cardio: Regular rate and rhythm. Normal S1 and S2. No murmurs. Respiratory: Thorax symmetrical with no labored breathing. Clear to ausculation bilaterally with symmetrical breath sounds.  There is

## 2021-02-17 RX ORDER — CETIRIZINE HYDROCHLORIDE 10 MG/1
10 TABLET ORAL DAILY
Qty: 30 TABLET | Refills: 2 | Status: SHIPPED | OUTPATIENT
Start: 2021-02-17 | End: 2021-05-15

## 2021-02-17 NOTE — TELEPHONE ENCOUNTER
Spoke to patient regarding message below. She doesn't know whether she is supposed to be taking Cetrizine 10 mg daily or not. Medication says therapy completed on 12/8/20. Dr. Armas Payor advise.

## 2021-02-17 NOTE — TELEPHONE ENCOUNTER
Last seen: 12/18/20  Last refill: 5/23/20    Dr. Mary Marques review and sign pended order for Cetirizine if agreeable.

## 2021-03-03 RX ORDER — BUDESONIDE AND FORMOTEROL FUMARATE DIHYDRATE 160; 4.5 UG/1; UG/1
2 AEROSOL RESPIRATORY (INHALATION) 2 TIMES DAILY
Qty: 1 INHALER | Refills: 5 | Status: SHIPPED | OUTPATIENT
Start: 2021-03-03 | End: 2021-09-09

## 2021-03-03 NOTE — TELEPHONE ENCOUNTER
• SYMBICORT 160-4.5 MCG/ACT Inhalation Aerosol INL 2 PFS PO BID IN THE MORNING AND IN THE PARMJIT  2

## 2021-05-15 RX ORDER — CETIRIZINE HYDROCHLORIDE 10 MG/1
10 TABLET ORAL DAILY
Qty: 30 TABLET | Refills: 2 | Status: SHIPPED | OUTPATIENT
Start: 2021-05-15 | End: 2021-08-09

## 2021-06-10 NOTE — TELEPHONE ENCOUNTER
LOV: 12/18/20  Last refill: 2013 (?)  Per Ada Roa 12/18/20 OV note:  -I counseled patient on use of albuterol as she previously was using only in \"emergency\" situations  -There is wheezing only with forced exhalation today  -No evidence of infection  -

## 2021-06-11 RX ORDER — ALBUTEROL SULFATE 90 UG/1
AEROSOL, METERED RESPIRATORY (INHALATION)
Qty: 18 G | Refills: 0 | Status: SHIPPED | OUTPATIENT
Start: 2021-06-11 | End: 2021-08-06

## 2021-08-06 RX ORDER — ALBUTEROL SULFATE 90 UG/1
AEROSOL, METERED RESPIRATORY (INHALATION)
Qty: 18 G | Refills: 1 | Status: SHIPPED | OUTPATIENT
Start: 2021-08-06

## 2021-08-13 RX ORDER — CETIRIZINE HYDROCHLORIDE 10 MG/1
TABLET ORAL
Qty: 30 TABLET | Refills: 2 | OUTPATIENT
Start: 2021-08-13

## 2021-09-30 ENCOUNTER — LAB REQUISITION (OUTPATIENT)
Dept: LAB | Facility: HOSPITAL | Age: 66
End: 2021-09-30

## 2021-09-30 DIAGNOSIS — Z00.00 ENCOUNTER FOR GENERAL ADULT MEDICAL EXAMINATION WITHOUT ABNORMAL FINDINGS: ICD-10-CM

## 2021-09-30 PROCEDURE — U0004 COV-19 TEST NON-CDC HGH THRU: HCPCS | Performed by: INTERNAL MEDICINE

## 2021-10-01 LAB — SARS-COV-2 ORF1AB RESP QL NAA+PROBE: NOT DETECTED

## 2021-10-05 RX ORDER — BUDESONIDE AND FORMOTEROL FUMARATE DIHYDRATE 160; 4.5 UG/1; UG/1
2 AEROSOL RESPIRATORY (INHALATION) 2 TIMES DAILY
Qty: 1 EACH | Refills: 1 | Status: SHIPPED | OUTPATIENT
Start: 2021-10-05 | End: 2021-12-03

## 2021-12-03 RX ORDER — BUDESONIDE AND FORMOTEROL FUMARATE DIHYDRATE 160; 4.5 UG/1; UG/1
AEROSOL RESPIRATORY (INHALATION)
Qty: 10.2 G | Refills: 3 | Status: SHIPPED | OUTPATIENT
Start: 2021-12-03

## 2021-12-03 NOTE — TELEPHONE ENCOUNTER
LOV: 12/18/2020  Last refill: 10/05/2021    Damion OHARA-please review and sign pended order if agreeable.

## 2022-03-10 RX ORDER — MONTELUKAST SODIUM 10 MG/1
TABLET ORAL
Qty: 30 TABLET | Refills: 1 | Status: SHIPPED | OUTPATIENT
Start: 2022-03-10

## 2022-03-10 NOTE — TELEPHONE ENCOUNTER
Primary Dr. Louise Lombardo was the last to approve this medication.   Routing to her for review and approval.      Dr. Louise Lombardo:   Patient last saw you on 9/9/21

## 2022-03-14 ENCOUNTER — TELEPHONE (OUTPATIENT)
Dept: PULMONOLOGY | Facility: CLINIC | Age: 67
End: 2022-03-14

## 2022-03-14 NOTE — TELEPHONE ENCOUNTER
Patient indicates she uses a cpap that requires distilled water. Patient asking about portable cpap and also a smaller version that does not require distilled water. Please call at [phone].

## 2022-03-15 NOTE — TELEPHONE ENCOUNTER
I do not have an issue with that as long as she is able to get one through [facility] but the other thing would be I do not know how much her out-of-pocket cost would be for this.

## 2022-04-11 RX ORDER — BUDESONIDE AND FORMOTEROL FUMARATE DIHYDRATE 160; 4.5 UG/1; UG/1
AEROSOL RESPIRATORY (INHALATION)
Qty: 10.2 G | Refills: 1 | Status: SHIPPED | OUTPATIENT
Start: 2022-04-11

## 2022-04-22 NOTE — TELEPHONE ENCOUNTER
Informed pt of Dr. Rita Melgoza orders below. She stts her sister has an extra portable CPAP which does not require distilled water, she would like to use it when she goes to her dad's house (using her own machine only now), & wonders if we could change settings, but she doesn't have machine now to provide any info. Explained settings are usually changed through DME supplier, sometimes data download has to be obtained through DME supplier, she is due for an appt, & we can take a look at her machine when she comes in for an appt if she would like. Scheduled pt on 6/2 2:10 pm [facility] per pt request. Appt info given. She voiced understanding & appreciation. Per pt her asthma is currently well controlled.

## 2022-05-12 RX ORDER — BUDESONIDE AND FORMOTEROL FUMARATE DIHYDRATE 160; 4.5 UG/1; UG/1
AEROSOL RESPIRATORY (INHALATION)
Qty: 10.2 G | Refills: 2 | Status: SHIPPED | OUTPATIENT
Start: 2022-05-12

## 2022-05-12 NOTE — TELEPHONE ENCOUNTER
Last office visit with Clover Escalera on 12/18/20    SEeing Dr. Mio Crockett on 6/2/22    Last refill 4/11/22

## 2022-06-02 ENCOUNTER — OFFICE VISIT (OUTPATIENT)
Dept: PULMONOLOGY | Facility: CLINIC | Age: 67
End: 2022-06-02
Payer: MEDICAID

## 2022-06-02 VITALS
BODY MASS INDEX: 35.7 KG/M2 | RESPIRATION RATE: 16 BRPM | SYSTOLIC BLOOD PRESSURE: 131 MMHG | WEIGHT: 194 LBS | HEART RATE: 85 BPM | DIASTOLIC BLOOD PRESSURE: 88 MMHG | HEIGHT: 62 IN | OXYGEN SATURATION: 96 %

## 2022-06-02 DIAGNOSIS — G47.33 OSA (OBSTRUCTIVE SLEEP APNEA): Primary | ICD-10-CM

## 2022-06-02 PROCEDURE — 3079F DIAST BP 80-89 MM HG: CPT | Performed by: INTERNAL MEDICINE

## 2022-06-02 PROCEDURE — 3008F BODY MASS INDEX DOCD: CPT | Performed by: INTERNAL MEDICINE

## 2022-06-02 PROCEDURE — 99213 OFFICE O/P EST LOW 20 MIN: CPT | Performed by: INTERNAL MEDICINE

## 2022-06-02 PROCEDURE — 3075F SYST BP GE 130 - 139MM HG: CPT | Performed by: INTERNAL MEDICINE

## 2022-06-02 RX ORDER — BUDESONIDE AND FORMOTEROL FUMARATE DIHYDRATE 160; 4.5 UG/1; UG/1
2 AEROSOL RESPIRATORY (INHALATION) 2 TIMES DAILY
Qty: 1 EACH | Refills: 5 | Status: SHIPPED | OUTPATIENT
Start: 2022-06-02

## 2022-06-02 RX ORDER — MONTELUKAST SODIUM 10 MG/1
10 TABLET ORAL NIGHTLY
Qty: 30 TABLET | Refills: 5 | Status: SHIPPED | OUTPATIENT
Start: 2022-06-02

## 2022-06-02 RX ORDER — IPRATROPIUM BROMIDE AND ALBUTEROL SULFATE 2.5; .5 MG/3ML; MG/3ML
3 SOLUTION RESPIRATORY (INHALATION) 4 TIMES DAILY
Qty: 60 EACH | Refills: 5 | Status: SHIPPED | OUTPATIENT
Start: 2022-06-02

## 2022-06-02 RX ORDER — ALBUTEROL SULFATE 90 UG/1
2 AEROSOL, METERED RESPIRATORY (INHALATION) EVERY 6 HOURS PRN
Qty: 1 EACH | Refills: 5 | Status: SHIPPED | OUTPATIENT
Start: 2022-06-02

## 2022-06-02 NOTE — PROGRESS NOTES
Referring Physician  Gonzalez Washington MD    History of Present Illness  Patient presents today for follow-up visit to pulmonary clinic. Patient has been using CPAP device most days with improvement in hypersomnia and fatigue. Tolerates her device well. Asthma symptoms well controlled. Denies significant exacerbation over the course of last year. Using Symbicort on a twice daily basis. Infrequently requires nebulizer treatments. SYMBICORT 160-4.5 MCG/ACT Inhalation Aerosol, INHALE 2 PUFFS INTO THE LUNGS TWICE DAILY, Disp: 10.2 g, Rfl: 2  HYDROCHLOROTHIAZIDE 25 MG Oral Tab, TAKE 1 TABLET(25 MG) BY MOUTH DAILY, Disp: 90 tablet, Rfl: 0  MONTELUKAST 10 MG Oral Tab, TAKE 1 TABLET(10 MG) BY MOUTH EVERY NIGHT, Disp: 30 tablet, Rfl: 1  CETIRIZINE 10 MG Oral Tab, TAKE 1 TABLET BY MOUTH DAILY, Disp: 30 tablet, Rfl: 5  LOSARTAN 100 MG Oral Tab, TAKE 1 TABLET(100 MG) BY MOUTH DAILY, Disp: 90 tablet, Rfl: 0  ALBUTEROL SULFATE  (90 Base) MCG/ACT Inhalation Aero Soln, INHALE 2 PUFFS BY MOUTH EVERY 4 TO 6 HOURS AS NEEDED FOR SHORTNESS OF BREATH, Disp: 18 g, Rfl: 1  ATORVASTATIN 10 MG Oral Tab, TAKE 1 TABLET(10 MG) BY MOUTH DAILY, Disp: 90 tablet, Rfl: 0  Metoprolol Succinate ER 50 MG Oral Tablet 24 Hr, Take 0.5 tablets (25 mg total) by mouth 2 (two) times daily. (Patient taking differently: Take 25 mg by mouth. 1/2 tab in day and 50 mg at night), Disp: 30 tablet, Rfl: 0  omeprazole 20 MG Oral Capsule Delayed Release, Take 1 capsule (20 mg total) by mouth 2 (two) times daily before meals. Before meal, Disp: 30 capsule, Rfl: 1  VICTOZA 18 MG/3ML Subcutaneous Solution Pen-injector, ADM 1.8 MG SC D, Disp: , Rfl: 3  STEGLATRO 15 MG Oral Tab tablet, TK 1 T PO D, Disp: , Rfl: 0  ammonium lactate 12 % External Lotion, Apply topically. , Disp: , Rfl:   HYDROcodone-acetaminophen 5-325 MG Oral Tab, Take 1 tablet by mouth as needed for Pain., Disp: , Rfl:   metFORMIN HCl 1000 MG Oral Tab, Take 1,000 mg by mouth 2 (two) times daily with meals. , Disp: , Rfl:   ipratropium-albuterol 0.5-2.5 (3) MG/3ML Inhalation Solution, 3 mL., Disp: , Rfl:   triamcinolone acetonide 0.025 % External Cream, DYLAN THIN LAYER EXT AA BID, Disp: , Rfl:   Rivaroxaban 20 MG Oral Tab, 20 mg daily with food. , Disp: , Rfl:   Fluticasone Propionate (FLONASE) 50 MCG/ACT Nasal Suspension, 1 spray both nostrils BID (Patient taking differently: as needed. 1 spray both nostrils BID), Disp: 1 Bottle, Rfl: 12  FREESTYLE LITE In Vitro Strip, test blood sugars BID As directed, Disp: 100 Strip, Rfl: 6  Glucose Blood (ASCENSIA MICROFILL TEST) In Vitro Strip, TESTING DAILY, Disp: 100 Strip, Rfl: 3  FREESTYLE LANCETS Does not apply Misc, test blood sugars twice daily as directed, Disp: 100 Each, Rfl: 6    No current facility-administered medications on file prior to visit. Allergies    Azithromycin            RASH  Ceftriaxone             RASH  Fentanyl                UNKNOWN  Hydromorphone           UNKNOWN  Toradol [Ketorolac *        Comment:Shortness of breath (also received fentanyl and             hydromorphone at the same time)  Kiwi Extract            ITCHING    Comment:State she gets itching in throat when eating Kiwi    Physical Exam  Constitutional: no acute distress, alert, oriented  HEENT: PERRL, EOMI, nares patents, no nasal polyps   Cardio: RRR, S1 S2  Respiratory: Clear to auscultation bilaterally  GI: abdomen soft, non tender  Extremities: no clubbing, cyanosis, edema  Neurologic: no gross motor or sensory deficits  Skin: warm, dry  Lymphatic: no supraclavicular lymphadenopathy     Assessment  1. Extrinsic asthma   2. Upper airway cough syndrome  3. Obstructive sleep apnea  4. Prior recurrent DVT/pulmonary embolism    Plan  -Patient seen today for follow-up visit at pulmonary clinic. From asthma management standpoint advised to continue maintenance inhaler therapy with Symbicort twice daily. She should continue daily antihistamine and Singulair. Use albuterol MDI and nebulizer treatments on as-needed basis. Asthma symptoms well controlled at this point.  -Patient with underlying history of HORACIO. I reviewed download data over last 30 days with usage 77% of days. Average usage 7 hours and 43 minutes. AHI is 2.9.   Advised patient in continuing CPAP device and she is benefiting from it      Parish Barron, [address] [place] [address]

## 2022-11-01 ENCOUNTER — TELEPHONE (OUTPATIENT)
Dept: PULMONOLOGY | Facility: CLINIC | Age: 67
End: 2022-11-01

## 2022-11-01 NOTE — TELEPHONE ENCOUNTER
Received fax from [facility] for CPAP orders Placed on Dr. Melita Zambrano folder for review.

## 2022-11-22 RX ORDER — ALBUTEROL SULFATE 90 UG/1
AEROSOL, METERED RESPIRATORY (INHALATION)
Qty: 8.5 G | Refills: 3 | Status: SHIPPED | OUTPATIENT
Start: 2022-11-22

## 2022-11-22 NOTE — TELEPHONE ENCOUNTER
LOV: 6/02/2022  Last refill: 6/02/2022    Dr. Konrad Carlos review and sign pended prescription if agreebale.

## 2022-12-20 ENCOUNTER — TELEPHONE (OUTPATIENT)
Dept: PULMONOLOGY | Facility: CLINIC | Age: 67
End: 2022-12-20

## 2022-12-20 NOTE — TELEPHONE ENCOUNTER
Spoke with patient states she has been sick for about 2 months, given prednisone & doxycycline before Thanksgiving, blood sugar went up to almost 500, discontinued prednisone. Continues with cough with yellowish sputum, shortness of breath at rest, while sleeping and activity, wheezing, chest tightness. Taking Symbicort as directed and albuterol inhaler every 2 hours with immediate relief but does not last.  Advised patient to try nebulizer treatment instead of albuterol inhaler. Had chest xray and covid, influenza test today through [facility]. Lab results are available through care everywhere, but imaging results not available.

## 2022-12-20 NOTE — TELEPHONE ENCOUNTER
I do not have chest x-ray results can she somehow get chest x-ray results so I know what to recommend. Ideally she should come see me but now until the end of the year I am fully booked. Okay to see me sometime in January. I may consider additional course of antibiotic therapy. First I would want to obtain chest x-ray results.

## 2022-12-20 NOTE — TELEPHONE ENCOUNTER
Spoke with patient states xray results are still not available to her. States she will let pulmo office know when she has results. Follow up appointment scheduled with Dr. Khalida Lambert on 1/13/23 at 9:50am.  Verified date, time, location & parking, patient verbalized understanding.

## 2022-12-22 ENCOUNTER — TELEPHONE (OUTPATIENT)
Dept: PULMONOLOGY | Facility: CLINIC | Age: 67
End: 2022-12-22

## 2022-12-22 NOTE — TELEPHONE ENCOUNTER
Patient has cough and congestion, states she is currently taking doxycycline that from IM visit 12/20/22.

## 2022-12-22 NOTE — TELEPHONE ENCOUNTER
Patient indicates she needs rx solution for the nebulizer. Patient states her tubes she has . Please call to update patient at [phone],[facility].   *[facility]

## 2022-12-22 NOTE — TELEPHONE ENCOUNTER
Dr. Seay Leicester - patient requesting refill on nebulizer solution. [place] review/sign, if agreeable.

## 2022-12-22 NOTE — TELEPHONE ENCOUNTER
Spoke with patient informed her that DuoNeb was sent to pharmacy on 6/2/22. Patient verbalized understanding, will contact pharmacy.

## 2022-12-22 NOTE — TELEPHONE ENCOUNTER
You may let the patient know that I reviewed chest x-ray results with no evidence of pneumonia seen. If she is having signs of acute sinusitis which would involve nasal discharge, sinus pressure and pain I may consider trial of antibiotic therapy otherwise based on this chest x-ray I would not give additional antibiotic therapy if I am not having high suspicion for sinus infection. If patient is experiencing rest and wanting antibiotic therapy I would give 10-day course of Augmentin.

## 2022-12-23 RX ORDER — IPRATROPIUM BROMIDE AND ALBUTEROL SULFATE 2.5; .5 MG/3ML; MG/3ML
3 SOLUTION RESPIRATORY (INHALATION) 4 TIMES DAILY
Qty: 60 EACH | Refills: 5 | Status: SHIPPED | OUTPATIENT
Start: 2022-12-23

## 2023-03-06 NOTE — TELEPHONE ENCOUNTER
LOV: 6/2/2022  Last refill: 11/22/2022    Dr. Madelyn Urbina review and sign pended prescription if agreeable.

## 2023-03-07 RX ORDER — ALBUTEROL SULFATE 90 UG/1
AEROSOL, METERED RESPIRATORY (INHALATION)
Qty: 8.5 G | Refills: 3 | Status: SHIPPED | OUTPATIENT
Start: 2023-03-07

## 2023-03-20 ENCOUNTER — TELEPHONE (OUTPATIENT)
Dept: PULMONOLOGY | Facility: CLINIC | Age: 68
End: 2023-03-20

## 2023-03-20 NOTE — TELEPHONE ENCOUNTER
Received fax from [facility] requesting pt LOV and also a CPAP supply order. Placed in Dr. Grady Hussein folder for signature.

## 2023-03-30 NOTE — TELEPHONE ENCOUNTER
Spoke to Aurora from [facility], states signed order and LOV notes were faxed. Status will be updated from supply end at [facility]. Nothing further needed from pulmo office.

## 2023-04-13 ENCOUNTER — TELEPHONE (OUTPATIENT)
Dept: PULMONOLOGY | Facility: CLINIC | Age: 68
End: 2023-04-13

## 2023-04-19 NOTE — TELEPHONE ENCOUNTER
Spoke with Althea Lora from [facility], states patient needs new office visit after 10/2022 after patient switched insurance from [facility] to [facility]. Althea Lora states RN will need to provide new office visit notes and new CPAP order to [facility].

## 2023-04-19 NOTE — TELEPHONE ENCOUNTER
Spoke with Fernando Pineda, informed them of message from [facility] regarding new office visit notes due to change in insurance. Patient aware of follow up appointment on 6/8/2023. Patient states they will address wanting new CPAP at follow up appointment.

## 2023-06-08 ENCOUNTER — OFFICE VISIT (OUTPATIENT)
Dept: PULMONOLOGY | Facility: CLINIC | Age: 68
End: 2023-06-08

## 2023-06-08 ENCOUNTER — LAB ENCOUNTER (OUTPATIENT)
Dept: LAB | Facility: HOSPITAL | Age: 68
End: 2023-06-08
Attending: STUDENT IN AN ORGANIZED HEALTH CARE EDUCATION/TRAINING PROGRAM
Payer: MEDICARE

## 2023-06-08 VITALS
WEIGHT: 174 LBS | SYSTOLIC BLOOD PRESSURE: 128 MMHG | HEIGHT: 62 IN | OXYGEN SATURATION: 96 % | HEART RATE: 89 BPM | DIASTOLIC BLOOD PRESSURE: 79 MMHG | BODY MASS INDEX: 32.02 KG/M2 | RESPIRATION RATE: 20 BRPM

## 2023-06-08 DIAGNOSIS — R06.00 DYSPNEA, UNSPECIFIED TYPE: ICD-10-CM

## 2023-06-08 DIAGNOSIS — Z99.89 OSA ON CPAP: ICD-10-CM

## 2023-06-08 DIAGNOSIS — G47.33 OSA ON CPAP: ICD-10-CM

## 2023-06-08 DIAGNOSIS — R06.00 DYSPNEA, UNSPECIFIED TYPE: Primary | ICD-10-CM

## 2023-06-08 LAB — EOS CT CALC: 350 MM3 (ref 0–300)

## 2023-06-08 PROCEDURE — 82785 ASSAY OF IGE: CPT

## 2023-06-08 PROCEDURE — 36415 COLL VENOUS BLD VENIPUNCTURE: CPT

## 2023-06-08 PROCEDURE — 85048 AUTOMATED LEUKOCYTE COUNT: CPT

## 2023-06-08 RX ORDER — FLUTICASONE FUROATE, UMECLIDINIUM BROMIDE AND VILANTEROL TRIFENATATE 100; 62.5; 25 UG/1; UG/1; UG/1
1 POWDER RESPIRATORY (INHALATION) DAILY
Qty: 1 EACH | Refills: 5 | Status: SHIPPED | OUTPATIENT
Start: 2023-06-08

## 2023-06-08 NOTE — PROGRESS NOTES
[facility] status of patient's CPAP supplies and account as patient is having difficult time obtaining new supplies and filters. Per Danny Aponte, patient received CPAP supplies 3/23/23 and eligible for new supplies every 3 months with insurance therefore not eligible until 6/23/23. Spoke with patient and relayed above, she states she never received supplies in March. Recommended patient file claim with DME or purchase supplies out of pocket. Patient voiced understanding and agreeable with plan. Order for CPAP filters faxed to [facility] with chart notes and demographics attached. Received confirmation.

## 2023-06-08 NOTE — PROGRESS NOTES
Referring Physician  Antonia Bowie MD    History of Present Illness  Patient presents today for follow-up visit to pulmonary clinic. Denies to increased dyspnea over the course last several months. Frequently requiring prednisone. Had been on Symbicort and ProAir on month ago without significant improvement in symptoms. Some wheezing present. Occasional cough present. Using nebulizer treatments with some relief. Has not been provided new supplies for CPAP as a result has not used seizure episode over the last 3 months. ALBUTEROL 108 (90 Base) MCG/ACT Inhalation Aero Soln, INHALE 2 PUFFS INTO THE LUNGS EVERY 6 HOURS AS NEEDED FOR WHEEZING, Disp: 8.5 g, Rfl: 3  ipratropium-albuterol 0.5-2.5 (3) MG/3ML Inhalation Solution, Take 3 mL by nebulization 4 (four) times daily. , Disp: 60 each, Rfl: 5  Budesonide-Formoterol Fumarate (SYMBICORT) 160-4.5 MCG/ACT Inhalation Aerosol, Inhale 2 puffs into the lungs 2 (two) times daily. , Disp: 1 each, Rfl: 5  HYDROCHLOROTHIAZIDE 25 MG Oral Tab, TAKE 1 TABLET(25 MG) BY MOUTH DAILY, Disp: 90 tablet, Rfl: 0  MONTELUKAST 10 MG Oral Tab, TAKE 1 TABLET(10 MG) BY MOUTH EVERY NIGHT, Disp: 30 tablet, Rfl: 1  CETIRIZINE 10 MG Oral Tab, TAKE 1 TABLET BY MOUTH DAILY, Disp: 30 tablet, Rfl: 5  LOSARTAN 100 MG Oral Tab, TAKE 1 TABLET(100 MG) BY MOUTH DAILY, Disp: 90 tablet, Rfl: 0  ALBUTEROL SULFATE  (90 Base) MCG/ACT Inhalation Aero Soln, INHALE 2 PUFFS BY MOUTH EVERY 4 TO 6 HOURS AS NEEDED FOR SHORTNESS OF BREATH, Disp: 18 g, Rfl: 1  ATORVASTATIN 10 MG Oral Tab, TAKE 1 TABLET(10 MG) BY MOUTH DAILY, Disp: 90 tablet, Rfl: 0  Metoprolol Succinate ER 50 MG Oral Tablet 24 Hr, Take 0.5 tablets (25 mg total) by mouth 2 (two) times daily. (Patient taking differently: Take 0.5 tablets (25 mg total) by mouth.  1/2 tab in day and 50 mg at night), Disp: 30 tablet, Rfl: 0  omeprazole 20 MG Oral Capsule Delayed Release, Take 1 capsule (20 mg total) by mouth 2 (two) times daily before meals. Before meal, Disp: 30 capsule, Rfl: 1  VICTOZA 18 MG/3ML Subcutaneous Solution Pen-injector, ADM 1.8 MG SC D, Disp: , Rfl: 3  STEGLATRO 15 MG Oral Tab tablet, TK 1 T PO D, Disp: , Rfl: 0  ammonium lactate 12 % External Lotion, Apply topically. , Disp: , Rfl:   HYDROcodone-acetaminophen 5-325 MG Oral Tab, Take 1 tablet by mouth as needed for Pain., Disp: , Rfl:   metFORMIN HCl 1000 MG Oral Tab, Take 1 tablet (1,000 mg total) by mouth 2 (two) times daily with meals. , Disp: , Rfl:   ipratropium-albuterol 0.5-2.5 (3) MG/3ML Inhalation Solution, 3 mL., Disp: , Rfl:   triamcinolone acetonide 0.025 % External Cream, DYLAN THIN LAYER EXT AA BID, Disp: , Rfl:   Rivaroxaban 20 MG Oral Tab, 1 tablet (20 mg total) daily with food. , Disp: , Rfl:   Fluticasone Propionate (FLONASE) 50 MCG/ACT Nasal Suspension, 1 spray both nostrils BID (Patient taking differently: as needed. 1 spray both nostrils BID), Disp: 1 Bottle, Rfl: 12  FREESTYLE LITE In Vitro Strip, test blood sugars BID As directed, Disp: 100 Strip, Rfl: 6  Glucose Blood (ASCENSIA MICROFILL TEST) In Vitro Strip, TESTING DAILY, Disp: 100 Strip, Rfl: 3  FREESTYLE LANCETS Does not apply Misc, test blood sugars twice daily as directed, Disp: 100 Each, Rfl: 6  SYMBICORT 160-4.5 MCG/ACT Inhalation Aerosol, INHALE 2 PUFFS INTO THE LUNGS TWICE DAILY (Patient not taking: Reported on 6/8/2023), Disp: 10.2 g, Rfl: 2    No current facility-administered medications on file prior to visit.       Allergies    Azithromycin            RASH  Ceftriaxone             RASH  Fentanyl                UNKNOWN  Hydromorphone           UNKNOWN  Toradol [Ketorolac *        Comment:Shortness of breath (also received fentanyl and             hydromorphone at the same time)  Kiwi Extract            ITCHING    Comment:State she gets itching in throat when eating Kiwi    Physical Exam  Constitutional: no acute distress, alert, oriented  HEENT: PERRL, EOMI, nares patents, no nasal polyps   Cardio: RRR, S1 S2  Respiratory: Clear to auscultation bilaterally  GI: abdomen soft, non tender  Extremities: no clubbing, cyanosis, edema  Neurologic: no gross motor or sensory deficits  Skin: warm, dry  Lymphatic: no supraclavicular lymphadenopathy     Assessment  1. Extrinsic asthma   2. Upper airway cough syndrome  3. Obstructive sleep apnea  4. Prior recurrent DVT/pulmonary embolism    Plan  -Patient seen today for follow-up visit at pulmonary clinic. Some worsening dyspnea symptoms over the course last several months. I will obtain pulmonary function testing and eosinophil and IgE to further evaluate. I will switch maintenance inhaler therapy to Trelegy. Continue nebulizer treatments and albuterol MDI on as-needed basis. Urged avoidance of known triggers.  -Patient with underlying history of HORACIO. Patient has not used CPAP over the course of last 3 months since she has not been provided new supplies. Urged patient to continue using CPAP device.       Roney Adamson DO  Pulmonary Medicine  [facility], [facility]

## 2023-06-09 LAB — IGE SERPL-ACNC: 201 KU/L (ref 2–214)

## 2023-06-14 ENCOUNTER — TELEPHONE (OUTPATIENT)
Dept: PULMONOLOGY | Facility: CLINIC | Age: 68
End: 2023-06-14

## 2023-06-14 NOTE — TELEPHONE ENCOUNTER
Pt states she needs a refill for prednisone 20 mg I do not see this on the chart please follow up pt is running out

## 2023-06-15 RX ORDER — PREDNISONE 20 MG/1
20 TABLET ORAL DAILY
Qty: 20 TABLET | Refills: 0 | Status: SHIPPED | OUTPATIENT
Start: 2023-06-15

## 2023-06-15 NOTE — TELEPHONE ENCOUNTER
I have placed a script for prednisone 20 mg.  Given the I have prescribed 20 pills to be taken once daily I would tell her to take 2 pills 40 mg daily for first 4 days followed by 1 pill 20 mg once daily the next 3 days if she has asthma exacerbation

## 2023-06-21 ENCOUNTER — TELEPHONE (OUTPATIENT)
Dept: PULMONOLOGY | Facility: CLINIC | Age: 68
End: 2023-06-21

## 2023-06-21 DIAGNOSIS — J45.20 MILD INTERMITTENT ASTHMA WITHOUT COMPLICATION: Primary | ICD-10-CM

## 2023-06-27 RX ORDER — BUDESONIDE AND FORMOTEROL FUMARATE DIHYDRATE 160; 4.5 UG/1; UG/1
2 AEROSOL RESPIRATORY (INHALATION) 2 TIMES DAILY
Qty: 1 EACH | Refills: 5 | Status: SHIPPED | OUTPATIENT
Start: 2023-06-27

## 2023-06-28 ENCOUNTER — TELEPHONE (OUTPATIENT)
Dept: PULMONOLOGY | Facility: CLINIC | Age: 68
End: 2023-06-28

## 2023-09-18 ENCOUNTER — TELEPHONE (OUTPATIENT)
Dept: PULMONOLOGY | Facility: CLINIC | Age: 68
End: 2023-09-18

## 2023-10-05 RX ORDER — MONTELUKAST SODIUM 10 MG/1
TABLET ORAL
Qty: 30 TABLET | Refills: 1 | OUTPATIENT
Start: 2023-10-05

## 2023-10-05 RX ORDER — MONTELUKAST SODIUM 10 MG/1
TABLET ORAL
Qty: 30 TABLET | Refills: 5 | OUTPATIENT
Start: 2023-10-05

## 2023-11-13 ENCOUNTER — HOSPITAL ENCOUNTER (OUTPATIENT)
Dept: RESPIRATORY THERAPY | Facility: HOSPITAL | Age: 68
Discharge: HOME | End: 2023-11-13
Attending: INTERNAL MEDICINE
Payer: MEDICARE

## 2023-11-13 DIAGNOSIS — R06.00 DYSPNEA, UNSPECIFIED TYPE: ICD-10-CM

## 2023-11-13 PROCEDURE — 94726 PLETHYSMOGRAPHY LUNG VOLUMES: CPT | Performed by: INTERNAL MEDICINE

## 2023-11-13 PROCEDURE — 94729 DIFFUSING CAPACITY: CPT | Performed by: INTERNAL MEDICINE

## 2023-11-13 PROCEDURE — 94060 EVALUATION OF WHEEZING: CPT | Performed by: INTERNAL MEDICINE

## 2023-11-14 NOTE — PROCEDURES
[address]     10/15/1955 MRN [id number]   Height  58 inh  Age 79year old   Weight  174 lbs  Sex Female         Spirometry:   FEV1 2.01 L which is 93%  FEV1/FVC ratio 69% which is mildly reduced . Borderline bronchodilator response by improving FEV1 10% and greater than 200 cc      FVL:   Mild scooping at the end of expiratory limb indicating mild obstructive pattern      Lung Volume:   TLC 5.08 L which is 108%  Vital capacity 2.91 L which is 108 %      DLCO:   109%      Impression:   Mild obstructive pattern        Thank you for allowing me to participate in the care of your patient. Elfego Chris.  Antoinette Jaramillo MD  2023  4:25 PM

## 2023-11-20 RX ORDER — MONTELUKAST SODIUM 10 MG/1
10 TABLET ORAL NIGHTLY
Qty: 90 TABLET | Refills: 1 | Status: SHIPPED | OUTPATIENT
Start: 2023-11-20

## 2023-11-20 NOTE — TELEPHONE ENCOUNTER
LOV: 6/08/2023  Last refill: 3/10/2022    Dr. Mikal Greer review and sign pended prescription if agreeable.

## 2024-01-09 ENCOUNTER — HOSPITAL ENCOUNTER (OUTPATIENT)
Dept: GENERAL RADIOLOGY | Age: 69
Discharge: HOME | End: 2024-01-09
Attending: PHYSICIAN ASSISTANT

## 2024-01-09 ENCOUNTER — WALK IN (OUTPATIENT)
Dept: URGENT CARE | Age: 69
End: 2024-01-09

## 2024-01-09 VITALS
HEART RATE: 71 BPM | DIASTOLIC BLOOD PRESSURE: 91 MMHG | TEMPERATURE: 97.6 F | OXYGEN SATURATION: 96 % | SYSTOLIC BLOOD PRESSURE: 159 MMHG | RESPIRATION RATE: 16 BRPM

## 2024-01-09 DIAGNOSIS — J40 BRONCHITIS: Primary | ICD-10-CM

## 2024-01-09 DIAGNOSIS — R05.1 ACUTE COUGH: ICD-10-CM

## 2024-01-09 DIAGNOSIS — J45.901 MILD ASTHMA WITH EXACERBATION, UNSPECIFIED WHETHER PERSISTENT: ICD-10-CM

## 2024-01-09 PROCEDURE — 71046 X-RAY EXAM CHEST 2 VIEWS: CPT

## 2024-01-09 RX ORDER — DIAZEPAM 5 MG/1
TABLET ORAL
COMMUNITY

## 2024-01-09 RX ORDER — IPRATROPIUM BROMIDE AND ALBUTEROL SULFATE 2.5; .5 MG/3ML; MG/3ML
3 SOLUTION RESPIRATORY (INHALATION)
COMMUNITY

## 2024-01-09 RX ORDER — METOPROLOL SUCCINATE 25 MG/1
25 TABLET, EXTENDED RELEASE ORAL 2 TIMES DAILY
COMMUNITY

## 2024-01-09 RX ORDER — ALBUTEROL SULFATE 90 UG/1
2 AEROSOL, METERED RESPIRATORY (INHALATION) EVERY 6 HOURS PRN
Qty: 1 EACH | Refills: 0 | Status: SHIPPED | OUTPATIENT
Start: 2024-01-09

## 2024-01-09 RX ORDER — MONTELUKAST SODIUM 10 MG/1
10 TABLET ORAL DAILY
COMMUNITY

## 2024-01-09 RX ORDER — ALBUTEROL SULFATE 90 UG/1
2 AEROSOL, METERED RESPIRATORY (INHALATION) EVERY 6 HOURS PRN
COMMUNITY
End: 2024-01-09 | Stop reason: SDUPTHER

## 2024-01-09 RX ORDER — LOSARTAN POTASSIUM 100 MG/1
TABLET ORAL
COMMUNITY
Start: 2023-11-04

## 2024-01-09 RX ORDER — BUDESONIDE AND FORMOTEROL FUMARATE DIHYDRATE 160; 4.5 UG/1; UG/1
AEROSOL RESPIRATORY (INHALATION)
COMMUNITY
Start: 2024-01-03

## 2024-01-09 RX ORDER — PREDNISONE 20 MG/1
40 TABLET ORAL DAILY
Qty: 10 TABLET | Refills: 0 | Status: SHIPPED | OUTPATIENT
Start: 2024-01-09 | End: 2024-01-14

## 2024-01-09 RX ORDER — METHYLPREDNISOLONE 4 MG/1
4 TABLET ORAL SEE ADMIN INSTRUCTIONS
Qty: 21 TABLET | Refills: 0 | Status: SHIPPED | OUTPATIENT
Start: 2024-01-09 | End: 2024-01-09 | Stop reason: ALTCHOICE

## 2024-01-09 RX ORDER — CETIRIZINE HYDROCHLORIDE 10 MG/1
1 TABLET ORAL DAILY
COMMUNITY
Start: 2024-01-08

## 2024-01-09 RX ORDER — FLUTICASONE PROPIONATE AND SALMETEROL 232; 14 UG/1; UG/1
POWDER, METERED RESPIRATORY (INHALATION)
COMMUNITY

## 2024-01-09 RX ORDER — HYDROCHLOROTHIAZIDE 25 MG/1
1 TABLET ORAL DAILY
COMMUNITY
Start: 2024-01-02

## 2024-01-09 RX ORDER — BENZONATATE 100 MG/1
100 CAPSULE ORAL 3 TIMES DAILY PRN
Qty: 20 CAPSULE | Refills: 0 | Status: SHIPPED | OUTPATIENT
Start: 2024-01-09

## 2024-01-09 RX ORDER — ATORVASTATIN CALCIUM 10 MG/1
10 TABLET, FILM COATED ORAL DAILY
COMMUNITY

## 2024-01-09 RX ORDER — METFORMIN HYDROCHLORIDE 750 MG/1
750 TABLET, EXTENDED RELEASE ORAL DAILY
COMMUNITY

## 2024-01-09 RX ORDER — FERROUS SULFATE 325(65) MG
325 TABLET ORAL DAILY
COMMUNITY

## 2024-01-09 RX ORDER — GLIMEPIRIDE 4 MG/1
4 TABLET ORAL DAILY
COMMUNITY

## 2024-01-09 RX ORDER — HYDROCODONE BITARTRATE AND ACETAMINOPHEN 5; 325 MG/1; MG/1
1 TABLET ORAL PRN
COMMUNITY

## 2024-01-09 RX ORDER — AZELASTINE HYDROCHLORIDE 137 UG/1
SPRAY, METERED NASAL
COMMUNITY

## 2024-01-09 RX ORDER — DOXYCYCLINE HYCLATE 100 MG
100 TABLET ORAL 2 TIMES DAILY
Qty: 14 TABLET | Refills: 0 | Status: SHIPPED | OUTPATIENT
Start: 2024-01-09 | End: 2024-01-16

## 2024-01-09 ASSESSMENT — PAIN SCALES - GENERAL
PAINLEVEL_OUTOF10: 0
PAINLEVEL: 0
PAINLEVEL_OUTOF10: 0

## 2024-02-05 NOTE — TELEPHONE ENCOUNTER
Dr Mendez- please sign pended order for symbicort, if agreeable    LOV: 6/8/23 Last refill: 5/12/22

## 2024-02-06 RX ORDER — BUDESONIDE AND FORMOTEROL FUMARATE DIHYDRATE 160; 4.5 UG/1; UG/1
2 AEROSOL RESPIRATORY (INHALATION) 2 TIMES DAILY
Qty: 10.2 G | Refills: 0 | Status: SHIPPED | OUTPATIENT
Start: 2024-02-06

## 2024-02-13 ENCOUNTER — TELEPHONE (OUTPATIENT)
Dept: PULMONOLOGY | Facility: CLINIC | Age: 69
End: 2024-02-13

## 2024-02-13 NOTE — TELEPHONE ENCOUNTER
LOV: 6/8/23  LR: 11/20/23    Spoke with [facility] pharmacy, patient has 75 pills on file. Spoke with patient, informed her of above, patient verbalized understanding.

## 2024-02-28 RX ORDER — BUDESONIDE AND FORMOTEROL FUMARATE DIHYDRATE 160; 4.5 UG/1; UG/1
2 AEROSOL RESPIRATORY (INHALATION) 2 TIMES DAILY
Qty: 30.6 G | Refills: 1 | Status: SHIPPED | OUTPATIENT
Start: 2024-02-28

## 2024-02-28 NOTE — TELEPHONE ENCOUNTER
LOV 6/8/23  Last refill 2/6/24 - 1 inhaler 0 refills  Spoke with pharmacy staff who advised need new script with refills.      Dr. Mendez- please review/sign pending refill if agreeable.

## 2024-04-19 RX ORDER — MONTELUKAST SODIUM 10 MG/1
10 TABLET ORAL NIGHTLY
Qty: 90 TABLET | Refills: 3 | Status: SHIPPED | OUTPATIENT
Start: 2024-04-19

## 2024-04-19 NOTE — TELEPHONE ENCOUNTER
Dr Barth- please sign pended order for Montelukast 10mg, if agreeable    LOV: 6/8/23 Last refill: 11/20/23

## 2024-04-26 RX ORDER — ALBUTEROL SULFATE 90 UG/1
2 AEROSOL, METERED RESPIRATORY (INHALATION) EVERY 6 HOURS PRN
Qty: 8.5 G | Refills: 3 | Status: SHIPPED | OUTPATIENT
Start: 2024-04-26

## 2024-04-26 NOTE — TELEPHONE ENCOUNTER
LOV: 6/8/23  LR: Albuterol - 3/7/23, Symbicort 2/28/24    Spoke with patient, states she need Albuterol inhaler, has Symbicort inhalers    Dr. Mendez - Please review/sign pended refill for Albuterol

## 2024-04-26 NOTE — TELEPHONE ENCOUNTER
Patient requesting refills for Albuterol and Symbicort.  Please call.  Thank you.    Current Outpatient Medications   Medication Sig Dispense Refill    ALBUTEROL 108 (90 Base) MCG/ACT Inhalation Aero Soln INHALE 2 PUFFS INTO THE LUNGS EVERY 6 HOURS AS NEEDED FOR WHEEZING 8.5 g 3    SYMBICORT 160-4.5 MCG/ACT Inhalation Aerosol INHALE 2 PUFFS INTO THE LUNGS TWICE DAILY (Patient not taking: Reported on 6/8/2023) 10.2 g 2

## 2024-05-23 NOTE — TELEPHONE ENCOUNTER
Spoke with Em at [facility], states patient canceled new CPAP order from 2020 so order was voided. Ana Campos states patient would need new order for new CPAP. Spoke with patient, states their machine is \"at least 117 years old. \" Wishes to update CPAP machine. Detail Level: Detailed Quality 431: Preventive Care And Screening: Unhealthy Alcohol Use - Screening: Patient not identified as an unhealthy alcohol user when screened for unhealthy alcohol use using a systematic screening method Quality 226: Preventive Care And Screening: Tobacco Use: Screening And Cessation Intervention: Patient screened for tobacco use and is an ex/non-smoker

## 2024-05-28 ENCOUNTER — TELEPHONE (OUTPATIENT)
Dept: PULMONOLOGY | Facility: CLINIC | Age: 69
End: 2024-05-28

## 2024-05-28 RX ORDER — PREDNISONE 20 MG/1
TABLET ORAL
Qty: 11 TABLET | Refills: 0 | Status: SHIPPED | OUTPATIENT
Start: 2024-05-28

## 2024-05-28 NOTE — TELEPHONE ENCOUNTER
Dr. Mendez-patient requesting prednisone due to asthma exacerbation and ongoing persistent cough. Patient was recently traveling and has silvia sick since.

## 2025-02-26 RX ORDER — MONTELUKAST SODIUM 10 MG/1
10 TABLET ORAL NIGHTLY
Qty: 90 TABLET | Refills: 3 | OUTPATIENT
Start: 2025-02-26

## (undated) DEVICE — LINE MNTR ADLT SET O2 INTMD

## (undated) DEVICE — SNARE OPTMZ PLPCTM TRP

## (undated) DEVICE — Device: Brand: CUSTOM PROCEDURE KIT

## (undated) DEVICE — CONMED SCOPE SAVER BITE BLOCK, 20X27 MM: Brand: SCOPE SAVER

## (undated) DEVICE — FORCEP RADIAL JAW 4

## (undated) DEVICE — DISTAL ATTACHMENT CAP

## (undated) DEVICE — REVEAL DISTAL ATTACHMENT CAP

## (undated) DEVICE — MEDI-VAC NON-CONDUCTIVE SUCTION TUBING 6MM X 1.8M (6FT.) L: Brand: CARDINAL HEALTH

## (undated) DEVICE — CAPTIVATOR COLD THIN WIRE 10MM ROUNDED

## (undated) DEVICE — Device: Brand: DEFENDO AIR/WATER/SUCTION AND BIOPSY VALVE

## (undated) NOTE — LETTER
1/10/2020          [facility] [phone]    Dear Mili Ruth,       Here are the biopsy/pathology findings from your recent EGD (Upper  Endoscopy):  1. Prior inflammation in the esophagus has healed.   Esophagus biopsies

## (undated) NOTE — MR AVS SNAPSHOT
[address]  [phone]               Thank you for choosing us for your health care visit with [facility]. We are glad to serve you and happy to provide you with this summary of your visit. Is this a split study with titration if patient meets criteria?:  Yes    Indications/Symptoms:  Apnea    Comorbidities: (Check all that apply):  None    Follow-Up Care:  Referring physician will continue to manage patient's care    Assoc Dx:  OHRACIO (obstruc TAKE 1 TABLET BY MOUTH EVERY MORNING AND 1/2 TABLET BY MOUTH EVERY EVENING   Commonly known as:  AMARYL           MetFORMIN HCl 500 MG Tabs   TAKE 1 TABLET BY MOUTH 2 TIMES DAILY WITH MEALS   What changed:  See the new instructions.    Commonly known as:  G Fully enjoy your food when eating. Don’t eat while distracted and slow down. Avoid over sized portions. Don’t eat while when you’re bored.      EAT THESE FOODS MORE OFTEN: EAT THESE FOODS LESS OFTEN:   Make half your plate fruits and vegetables Highly

## (undated) NOTE — LETTER
10/28/2019          [phone]    Dear Eugenia Kline,       Here are the biopsy/pathology findings from your recent EGD (upper endoscopy) and colonoscopy:     The biopsy/pathology findings from your upper endosco